# Patient Record
Sex: MALE | Race: WHITE | NOT HISPANIC OR LATINO
[De-identification: names, ages, dates, MRNs, and addresses within clinical notes are randomized per-mention and may not be internally consistent; named-entity substitution may affect disease eponyms.]

---

## 2017-04-06 ENCOUNTER — APPOINTMENT (OUTPATIENT)
Dept: CARDIOLOGY | Facility: CLINIC | Age: 67
End: 2017-04-06

## 2017-04-20 ENCOUNTER — APPOINTMENT (OUTPATIENT)
Dept: CARDIOLOGY | Facility: CLINIC | Age: 67
End: 2017-04-20

## 2017-10-19 ENCOUNTER — APPOINTMENT (OUTPATIENT)
Dept: CARDIOLOGY | Facility: CLINIC | Age: 67
End: 2017-10-19
Payer: MEDICARE

## 2017-10-19 PROCEDURE — 99214 OFFICE O/P EST MOD 30 MIN: CPT

## 2017-11-01 ENCOUNTER — APPOINTMENT (OUTPATIENT)
Dept: CARDIOLOGY | Facility: CLINIC | Age: 67
End: 2017-11-01
Payer: MEDICARE

## 2017-11-01 PROCEDURE — 93015 CV STRESS TEST SUPVJ I&R: CPT

## 2017-11-01 PROCEDURE — 78452 HT MUSCLE IMAGE SPECT MULT: CPT

## 2017-11-01 PROCEDURE — A9502: CPT

## 2017-11-02 ENCOUNTER — APPOINTMENT (OUTPATIENT)
Dept: CARDIOLOGY | Facility: CLINIC | Age: 67
End: 2017-11-02
Payer: MEDICARE

## 2017-11-02 PROCEDURE — 99214 OFFICE O/P EST MOD 30 MIN: CPT

## 2018-04-26 ENCOUNTER — APPOINTMENT (OUTPATIENT)
Dept: CARDIOLOGY | Facility: CLINIC | Age: 68
End: 2018-04-26
Payer: MEDICARE

## 2018-04-26 VITALS
DIASTOLIC BLOOD PRESSURE: 62 MMHG | WEIGHT: 181 LBS | SYSTOLIC BLOOD PRESSURE: 118 MMHG | HEART RATE: 82 BPM | HEIGHT: 67 IN | BODY MASS INDEX: 28.41 KG/M2

## 2018-04-26 PROCEDURE — 99214 OFFICE O/P EST MOD 30 MIN: CPT

## 2018-04-26 RX ORDER — FLUTICASONE PROPIONATE 50 UG/1
50 SPRAY, METERED NASAL
Qty: 16 | Refills: 0 | Status: ACTIVE | COMMUNITY
Start: 2018-04-12

## 2018-04-26 RX ORDER — DICLOFENAC SODIUM 10 MG/G
1 GEL TOPICAL
Qty: 300 | Refills: 0 | Status: ACTIVE | COMMUNITY
Start: 2017-10-26

## 2018-04-26 RX ORDER — TERAZOSIN 10 MG/1
10 CAPSULE ORAL
Qty: 90 | Refills: 0 | Status: ACTIVE | COMMUNITY
Start: 2018-01-31

## 2018-07-19 ENCOUNTER — APPOINTMENT (OUTPATIENT)
Dept: CARDIOLOGY | Facility: CLINIC | Age: 68
End: 2018-07-19
Payer: MEDICARE

## 2018-07-19 PROCEDURE — 93306 TTE W/DOPPLER COMPLETE: CPT

## 2018-07-19 PROCEDURE — 93880 EXTRACRANIAL BILAT STUDY: CPT

## 2018-07-19 PROCEDURE — 93979 VASCULAR STUDY: CPT

## 2018-08-16 ENCOUNTER — APPOINTMENT (OUTPATIENT)
Dept: CARDIOLOGY | Facility: CLINIC | Age: 68
End: 2018-08-16
Payer: MEDICARE

## 2018-08-16 VITALS
HEART RATE: 93 BPM | DIASTOLIC BLOOD PRESSURE: 62 MMHG | BODY MASS INDEX: 29.03 KG/M2 | HEIGHT: 67 IN | SYSTOLIC BLOOD PRESSURE: 118 MMHG | WEIGHT: 185 LBS

## 2018-08-16 PROCEDURE — 99214 OFFICE O/P EST MOD 30 MIN: CPT

## 2019-02-21 ENCOUNTER — APPOINTMENT (OUTPATIENT)
Dept: CARDIOLOGY | Facility: CLINIC | Age: 69
End: 2019-02-21
Payer: MEDICARE

## 2019-02-21 VITALS
WEIGHT: 187 LBS | DIASTOLIC BLOOD PRESSURE: 70 MMHG | OXYGEN SATURATION: 96 % | SYSTOLIC BLOOD PRESSURE: 128 MMHG | HEIGHT: 67 IN | HEART RATE: 75 BPM | BODY MASS INDEX: 29.35 KG/M2

## 2019-02-21 PROCEDURE — 99214 OFFICE O/P EST MOD 30 MIN: CPT

## 2019-02-21 NOTE — PHYSICAL EXAM
[General Appearance - Well Developed] : well developed [Normal Appearance] : normal appearance [Well Groomed] : well groomed [General Appearance - Well Nourished] : well nourished [No Deformities] : no deformities [General Appearance - In No Acute Distress] : no acute distress [Normal Conjunctiva] : the conjunctiva exhibited no abnormalities [Eyelids - No Xanthelasma] : the eyelids demonstrated no xanthelasmas [Normal Oral Mucosa] : normal oral mucosa [No Oral Pallor] : no oral pallor [No Oral Cyanosis] : no oral cyanosis [Normal Jugular Venous A Waves Present] : normal jugular venous A waves present [Normal Jugular Venous V Waves Present] : normal jugular venous V waves present [No Jugular Venous Omalley A Waves] : no jugular venous omalley A waves [Respiration, Rhythm And Depth] : normal respiratory rhythm and effort [Exaggerated Use Of Accessory Muscles For Inspiration] : no accessory muscle use [Auscultation Breath Sounds / Voice Sounds] : lungs were clear to auscultation bilaterally [Heart Rate And Rhythm] : heart rate and rhythm were normal [Heart Sounds] : normal S1 and S2 [Murmurs] : no murmurs present [Abdomen Soft] : soft [Abdomen Tenderness] : non-tender [Abdomen Mass (___ Cm)] : no abdominal mass palpated [Abnormal Walk] : normal gait [Gait - Sufficient For Exercise Testing] : the gait was sufficient for exercise testing [Nail Clubbing] : no clubbing of the fingernails [Cyanosis, Localized] : no localized cyanosis [Petechial Hemorrhages (___cm)] : no petechial hemorrhages [Skin Color & Pigmentation] : normal skin color and pigmentation [] : no rash [No Venous Stasis] : no venous stasis [Skin Lesions] : no skin lesions [No Skin Ulcers] : no skin ulcer [No Xanthoma] : no  xanthoma was observed [Oriented To Time, Place, And Person] : oriented to person, place, and time [Affect] : the affect was normal [Mood] : the mood was normal [No Anxiety] : not feeling anxious

## 2019-02-21 NOTE — REVIEW OF SYSTEMS
[Shortness Of Breath] : shortness of breath [Heartburn] : heartburn [Negative] : Heme/Lymph [Dyspnea on exertion] : not dyspnea during exertion [Chest  Pressure] : no chest pressure [Chest Pain] : no chest pain [Lower Ext Edema] : no extremity edema [Leg Claudication] : no intermittent leg claudication [Palpitations] : no palpitations [Abdominal Pain] : no abdominal pain [Nausea] : no nausea [Vomiting] : no vomiting [Change in Appetite] : no change in appetite [Change In The Stool] : no change in stool [Dysphagia] : no dysphagia

## 2019-02-21 NOTE — ASSESSMENT
[FreeTextEntry1] : Korey is a 67-year-old male with medical history detailed above and active medical issues including: \par  \par -Atypical chest pain resolved, normal perfusion and PI stress test normal LVEF November 2017 \par \par - Borderline hypertension dietary control at goal <130/80.\par \par -Borderline dyslipidemia currently off statin therapy, repeat at guideline goal\par \par -Arthritis, knee pain and shoulder pain, limited exercise capacity.\par \par - Patient has mild leg cramping with walking.  Patient will have evaluation for PVD with SHIV lower extremities\par \par - Patient followed by neurology for Alzheimer's dementia\par \par -Family history of premature coronary artery disease.\par \par Advised patient to follow active lifestyle with regular cardiovascular exercise. Patient educated on lifestyle and diet modification with low sodium low fat diet and avoidance of excessive alcohol. Patient is aware to call with any symptoms or concerns. \par \par Cardiology followup 6 months. Patient currently not on cardiac medication. Repeat labs will be ordered with PMD. \par \par Korey will followup with Dr Mack Mancera for primary care\par

## 2019-02-21 NOTE — REASON FOR VISIT
[Follow-Up - Clinic] : a clinic follow-up of [FreeTextEntry2] : atypical chest pain, borderline HTN, borderline dyslipidemia [FreeTextEntry1] : Korey is a 68-year-old male with a history of borderline hypertension, borderline dyslipidemia, arthritis, knee pain and shoulder pain, and palpitations, GERD, atypical chest pain.\par \par No further chest pain since last office visit. Patient had chest pain evaluation at [facility] August 2017. Patient has dyspnea with moderate exertion, unchanged. Cardiovascular review of symptoms is negative for palpitations, dizziness or syncope. No PND or orthopnea leg edema. No bleeding or black stool.\par \par Patient exercising on a  bike 15 minutes without exertional chest pain. Patient has mild leg cramping with walking.  Patient will have evaluation for PVD with SHIV lower extremities\par \par Lexascan Myoview stress test November 2017, LDL 58%, normal perfusion, nonischemic EKG response, no anginal symptoms, baseline sinus rhythm PRWP, ASWMI unchanged, NSST\par  \par Lexiscan Myoview stress test April 2016, normal LVEF normal perfusion, nonischemic EKG Baseline sinus rhythm age undetermined anterolateral my, no anginal symptoms, 30\par \par 2-D echo March 2016, LVEF 60%, diastolic dysfunction, mild MR TR.\par \par Carotid and abdominal ultrasound March 2016, normal aortic size nonobstructive findings.\par \par Lexiscan Myoview stress test Feb 2015, normal LVEF normal wall motion, small inferoapical attenuation artifact, baseline NSR, PRWP\par \par 2DEcho Feb 2015, LVEF 60%, mild MR and TR\par \par Carotid and abdominal ultrasound nonobstructive, mid abdominal aorta 3.0cm (prior 1.6cm in 2012)\par \par EKG is sinus rhythm, age-indeterminate septal MI, inferolateral ST-T wave abnormality.\par

## 2019-03-20 ENCOUNTER — APPOINTMENT (OUTPATIENT)
Dept: CARDIOLOGY | Facility: CLINIC | Age: 69
End: 2019-03-20
Payer: MEDICARE

## 2019-03-20 PROCEDURE — 93923 UPR/LXTR ART STDY 3+ LVLS: CPT

## 2019-03-20 PROCEDURE — 93306 TTE W/DOPPLER COMPLETE: CPT

## 2019-03-27 ENCOUNTER — APPOINTMENT (OUTPATIENT)
Dept: CARDIOLOGY | Facility: CLINIC | Age: 69
End: 2019-03-27
Payer: MEDICARE

## 2019-03-27 VITALS
OXYGEN SATURATION: 99 % | DIASTOLIC BLOOD PRESSURE: 64 MMHG | WEIGHT: 180 LBS | SYSTOLIC BLOOD PRESSURE: 110 MMHG | HEART RATE: 76 BPM | BODY MASS INDEX: 28.25 KG/M2 | HEIGHT: 67 IN

## 2019-03-27 PROCEDURE — 99214 OFFICE O/P EST MOD 30 MIN: CPT

## 2019-03-27 NOTE — ASSESSMENT
[FreeTextEntry1] : Korey is a 68-year-old male with medical history detailed above and active medical issues including: \par  \par -Atypical chest pain resolved, normal perfusion and PI stress test normal LVEF November 2017. \par \par - Borderline hypertension dietary control at goal <130/80.\par \par -Borderline dyslipidemia currently off statin therapy, repeat at guideline goal\par \par -Arthritis, knee pain and shoulder pain, limited exercise capacity.\par \par - No significant leg claudication, normal SHIV\par \par - Patient followed by neurology for Alzheimer's dementia\par \par -Family history of premature coronary artery disease.\par \par Advised patient to follow active lifestyle with regular cardiovascular exercise. Patient educated on lifestyle and diet modification with low sodium low fat diet and avoidance of excessive alcohol. Patient is aware to call with any symptoms or concerns. \par \par Cardiology followup 6 months. Patient currently not on cardiac medication. Repeat labs will be ordered with PMD. \par \par Korey will followup with Dr Mack Mancera for primary care\par

## 2019-03-27 NOTE — REASON FOR VISIT
[Follow-Up - Clinic] : a clinic follow-up of [FreeTextEntry2] : atypical chest pain, borderline HTN, borderline dyslipidemia [FreeTextEntry1] : Korey is a 68-year-old male with a history of borderline hypertension, borderline dyslipidemia, arthritis, knee pain and shoulder pain, and palpitations, GERD, atypical chest pain.\par \par No further chest pain since last office visit. Patient had chest pain evaluation at [facility] August 2017. Patient has dyspnea with moderate exertion, unchanged. Cardiovascular review of symptoms is negative for palpitations, dizziness or syncope. No PND or orthopnea leg edema. No bleeding or black stool.\par \par Patient exercising on a  bike 20 minutes without exertional chest pain.\par \par Echocardiogram March 2019, LVEF 65%, mild concentric LVH, mild diastolic dysfunction, mild MR, AI, TR, normal RVSP\par \par SHIV March 2019 normal bilateral study, noncompressible right calf. \par \par Lexascan Myoview stress test November 2017, LDL 58%, normal perfusion, nonischemic EKG response, no anginal symptoms, baseline sinus rhythm PRWP, ASWMI unchanged, NSST\par  \par Lexiscan Myoview stress test April 2016, normal LVEF normal perfusion, nonischemic EKG Baseline sinus rhythm age undetermined anterolateral my, no anginal symptoms, 30\par \par 2-D echo March 2016, LVEF 60%, diastolic dysfunction, mild MR TR.\par \par Carotid and abdominal ultrasound March 2016, normal aortic size nonobstructive findings.\par \par Lexiscan Myoview stress test Feb 2015, normal LVEF normal wall motion, small inferoapical attenuation artifact, baseline NSR, PRWP\par \par 2DEcho Feb 2015, LVEF 60%, mild MR and TR\par \par Carotid and abdominal ultrasound nonobstructive, mid abdominal aorta 3.0cm (prior 1.6cm in 2012)\par \par EKG is sinus rhythm, age-indeterminate septal MI, inferolateral ST-T wave abnormality.\par

## 2019-03-27 NOTE — REVIEW OF SYSTEMS
[Negative] : Heme/Lymph [Shortness Of Breath] : no shortness of breath [Dyspnea on exertion] : not dyspnea during exertion [Chest  Pressure] : no chest pressure [Chest Pain] : no chest pain [Lower Ext Edema] : no extremity edema [Leg Claudication] : no intermittent leg claudication [Palpitations] : no palpitations [Abdominal Pain] : no abdominal pain [Nausea] : no nausea [Vomiting] : no vomiting [Heartburn] : no heartburn [Change in Appetite] : no change in appetite [Change In The Stool] : no change in stool [Dysphagia] : no dysphagia

## 2019-10-03 ENCOUNTER — APPOINTMENT (OUTPATIENT)
Dept: CARDIOLOGY | Facility: CLINIC | Age: 69
End: 2019-10-03

## 2019-10-25 ENCOUNTER — NON-APPOINTMENT (OUTPATIENT)
Age: 69
End: 2019-10-25

## 2019-10-25 ENCOUNTER — APPOINTMENT (OUTPATIENT)
Dept: CARDIOLOGY | Facility: CLINIC | Age: 69
End: 2019-10-25
Payer: MEDICARE

## 2019-10-25 VITALS
HEART RATE: 67 BPM | HEIGHT: 67 IN | DIASTOLIC BLOOD PRESSURE: 72 MMHG | SYSTOLIC BLOOD PRESSURE: 120 MMHG | BODY MASS INDEX: 28.25 KG/M2 | WEIGHT: 180 LBS | OXYGEN SATURATION: 95 %

## 2019-10-25 PROCEDURE — 99214 OFFICE O/P EST MOD 30 MIN: CPT

## 2019-10-25 PROCEDURE — 93000 ELECTROCARDIOGRAM COMPLETE: CPT

## 2019-10-25 NOTE — REVIEW OF SYSTEMS
[Negative] : Heme/Lymph [Dyspnea on exertion] : dyspnea during exertion [Shortness Of Breath] : no shortness of breath [Chest  Pressure] : no chest pressure [Chest Pain] : no chest pain [Lower Ext Edema] : no extremity edema [Leg Claudication] : no intermittent leg claudication [Palpitations] : no palpitations [Abdominal Pain] : no abdominal pain [Nausea] : no nausea [Vomiting] : no vomiting [Heartburn] : no heartburn [Change in Appetite] : no change in appetite [Change In The Stool] : no change in stool [Dysphagia] : no dysphagia

## 2019-10-25 NOTE — ASSESSMENT
[FreeTextEntry1] : Korey is a 68-year-old male with medical history detailed above and active medical issues including: \par  \par -Patient has dyspnea with moderate exertion, abnormal baseline EKG, multiple CAD risk factors. Patient has knee pain and is unable to complete a treadmill stress.  Patient will be scheduled for pharmacologic stress test.\par \par - Borderline hypertension dietary control at goal <130/80.\par \par -Borderline dyslipidemia currently off statin therapy, repeat at guideline goal\par \par -Arthritis, knee pain and shoulder pain, limited exercise capacity.\par \par - No significant leg claudication, normal SHIV\par \par - Patient followed by neurology for Alzheimer's dementia\par \par -Family history of premature coronary artery disease.\par \par Advised patient to follow active lifestyle with regular cardiovascular exercise. Patient educated on lifestyle and diet modification with low sodium low fat diet and avoidance of excessive alcohol. Patient is aware to call with any symptoms or concerns. \par \par Patient will be seen in cardiology follow-up after noninvasive testing. Patient currently not on cardiac medication. Repeat labs will be ordered with PMD. \par \par Korey will followup with Dr Mack Mancera for primary care\par

## 2019-10-25 NOTE — PHYSICAL EXAM
[General Appearance - Well Developed] : well developed [Normal Appearance] : normal appearance [No Deformities] : no deformities [Well Groomed] : well groomed [General Appearance - Well Nourished] : well nourished [General Appearance - In No Acute Distress] : no acute distress [Normal Conjunctiva] : the conjunctiva exhibited no abnormalities [Eyelids - No Xanthelasma] : the eyelids demonstrated no xanthelasmas [Normal Oral Mucosa] : normal oral mucosa [No Oral Pallor] : no oral pallor [No Oral Cyanosis] : no oral cyanosis [Normal Jugular Venous V Waves Present] : normal jugular venous V waves present [Normal Jugular Venous A Waves Present] : normal jugular venous A waves present [No Jugular Venous Omalley A Waves] : no jugular venous omalley A waves [Respiration, Rhythm And Depth] : normal respiratory rhythm and effort [Exaggerated Use Of Accessory Muscles For Inspiration] : no accessory muscle use [Auscultation Breath Sounds / Voice Sounds] : lungs were clear to auscultation bilaterally [Heart Rate And Rhythm] : heart rate and rhythm were normal [Heart Sounds] : normal S1 and S2 [Murmurs] : no murmurs present [Abdomen Soft] : soft [Abdomen Tenderness] : non-tender [Abdomen Mass (___ Cm)] : no abdominal mass palpated [Gait - Sufficient For Exercise Testing] : the gait was sufficient for exercise testing [Abnormal Walk] : normal gait [Nail Clubbing] : no clubbing of the fingernails [Petechial Hemorrhages (___cm)] : no petechial hemorrhages [Cyanosis, Localized] : no localized cyanosis [] : no rash [Skin Color & Pigmentation] : normal skin color and pigmentation [No Venous Stasis] : no venous stasis [Skin Lesions] : no skin lesions [No Skin Ulcers] : no skin ulcer [No Xanthoma] : no  xanthoma was observed [Oriented To Time, Place, And Person] : oriented to person, place, and time [Affect] : the affect was normal [Mood] : the mood was normal [No Anxiety] : not feeling anxious

## 2019-10-25 NOTE — REASON FOR VISIT
[Follow-Up - Clinic] : a clinic follow-up of [FreeTextEntry2] : [place], abnormal EKG, atypical chest pain, borderline HTN, borderline dyslipidemia [FreeTextEntry1] : Korey is a 69-year-old male with a history of borderline hypertension, borderline dyslipidemia, Alzheimer's dementia, arthritis, knee pain and shoulder pain, and palpitations, GERD, atypical chest pain.\par \par Patient has dyspnea with moderate exertion. Cardiovascular review of symptoms is negative for exertional chest pain, palpitations, dizziness or syncope.  No PND or orthopnea leg edema.  No bleeding or black stool.\par \par Patient has knee pain and is unable to complete a treadmill stress.  Patient will be scheduled for pharmacologic stress test.\par \par Echocardiogram March 2019, LVEF 65%, mild concentric LVH, mild diastolic dysfunction, mild MR, AI, TR, normal RVSP\par \par SHIV March 2019 normal bilateral study, noncompressible right calf. \par \par Lexascan Myoview stress test November 2017, LDL 58%, normal perfusion, nonischemic EKG response, no anginal symptoms, baseline sinus rhythm PRWP, ASWMI unchanged, NSST\par  \par Lexiscan Myoview stress test April 2016, normal LVEF normal perfusion, nonischemic EKG Baseline sinus rhythm age undetermined anterolateral my, no anginal symptoms, 30\par \par 2-D echo March 2016, LVEF 60%, diastolic dysfunction, mild MR TR.\par \par Carotid and abdominal ultrasound March 2016, normal aortic size nonobstructive findings.\par \par Lexiscan Myoview stress test Feb 2015, normal LVEF normal wall motion, small inferoapical attenuation artifact, baseline NSR, PRWP\par \par 2DEcho Feb 2015, LVEF 60%, mild MR and TR\par \par Carotid and abdominal ultrasound nonobstructive, mid abdominal aorta 3.0cm (prior 1.6cm in 2012)\par \par EKG is sinus rhythm, age-indeterminate septal MI, inferolateral ST-T wave abnormality.\par

## 2019-10-31 ENCOUNTER — APPOINTMENT (OUTPATIENT)
Dept: CARDIOLOGY | Facility: CLINIC | Age: 69
End: 2019-10-31

## 2019-11-15 ENCOUNTER — APPOINTMENT (OUTPATIENT)
Dept: CARDIOLOGY | Facility: CLINIC | Age: 69
End: 2019-11-15
Payer: MEDICARE

## 2019-11-15 PROCEDURE — 93015 CV STRESS TEST SUPVJ I&R: CPT

## 2019-11-15 PROCEDURE — 78452 HT MUSCLE IMAGE SPECT MULT: CPT

## 2019-11-15 PROCEDURE — A9502: CPT

## 2019-12-12 ENCOUNTER — APPOINTMENT (OUTPATIENT)
Dept: CARDIOLOGY | Facility: CLINIC | Age: 69
End: 2019-12-12
Payer: MEDICARE

## 2019-12-12 VITALS
DIASTOLIC BLOOD PRESSURE: 70 MMHG | OXYGEN SATURATION: 95 % | WEIGHT: 175 LBS | BODY MASS INDEX: 27.47 KG/M2 | HEIGHT: 67 IN | HEART RATE: 88 BPM | SYSTOLIC BLOOD PRESSURE: 122 MMHG

## 2019-12-12 PROCEDURE — 99214 OFFICE O/P EST MOD 30 MIN: CPT

## 2019-12-12 RX ORDER — MEMANTINE HYDROCHLORIDE 5 MG-10 MG
28 X 5 MG & KIT ORAL
Qty: 49 | Refills: 0 | Status: DISCONTINUED | COMMUNITY
Start: 2018-04-04 | End: 2019-12-12

## 2019-12-12 NOTE — ASSESSMENT
[FreeTextEntry1] : Korey is a 69-year-old male with medical history detailed above and active medical issues including: \par  \par - No anginal symptoms, normal perfusion Myoview stress test, normal LVEF, December 2019\par \par - Borderline hypertension dietary control at goal <130/80.\par \par - Borderline dyslipidemia currently off statin therapy, repeat at guideline goal\par \par - Arthritis, knee pain and shoulder pain, limited exercise capacity.\par \par - No significant leg claudication, normal SHIV\par \par - Patient followed by neurology for Alzheimer's dementia\par \par - Family history of premature coronary artery disease.\par \par Advised patient to follow active lifestyle with regular cardiovascular exercise. Patient educated on lifestyle and diet modification with low sodium low fat diet and avoidance of excessive alcohol. Patient is aware to call with any symptoms or concerns. \par \par Patient will be seen in cardiology follow-up after noninvasive testing. Patient currently not on cardiac medication. Repeat labs will be ordered with PMD. \par \par Korey will followup with Dr Mack Mancera for primary care\par

## 2019-12-12 NOTE — PHYSICAL EXAM
[General Appearance - Well Developed] : well developed [Normal Appearance] : normal appearance [Well Groomed] : well groomed [No Deformities] : no deformities [General Appearance - Well Nourished] : well nourished [General Appearance - In No Acute Distress] : no acute distress [Normal Conjunctiva] : the conjunctiva exhibited no abnormalities [Eyelids - No Xanthelasma] : the eyelids demonstrated no xanthelasmas [Normal Oral Mucosa] : normal oral mucosa [No Oral Pallor] : no oral pallor [No Oral Cyanosis] : no oral cyanosis [Normal Jugular Venous A Waves Present] : normal jugular venous A waves present [Normal Jugular Venous V Waves Present] : normal jugular venous V waves present [No Jugular Venous Omalley A Waves] : no jugular venous omalley A waves [Respiration, Rhythm And Depth] : normal respiratory rhythm and effort [Exaggerated Use Of Accessory Muscles For Inspiration] : no accessory muscle use [Auscultation Breath Sounds / Voice Sounds] : lungs were clear to auscultation bilaterally [Heart Rate And Rhythm] : heart rate and rhythm were normal [Heart Sounds] : normal S1 and S2 [Murmurs] : no murmurs present [Abdomen Soft] : soft [Abdomen Tenderness] : non-tender [Abdomen Mass (___ Cm)] : no abdominal mass palpated [Abnormal Walk] : normal gait [Gait - Sufficient For Exercise Testing] : the gait was sufficient for exercise testing [Nail Clubbing] : no clubbing of the fingernails [Cyanosis, Localized] : no localized cyanosis [Petechial Hemorrhages (___cm)] : no petechial hemorrhages [Skin Color & Pigmentation] : normal skin color and pigmentation [] : no rash [No Venous Stasis] : no venous stasis [Skin Lesions] : no skin lesions [No Skin Ulcers] : no skin ulcer [No Xanthoma] : no  xanthoma was observed [Oriented To Time, Place, And Person] : oriented to person, place, and time [Affect] : the affect was normal [Mood] : the mood was normal [No Anxiety] : not feeling anxious

## 2019-12-12 NOTE — REVIEW OF SYSTEMS
[Dyspnea on exertion] : dyspnea during exertion [Negative] : Heme/Lymph [Shortness Of Breath] : no shortness of breath [Chest Pain] : no chest pain [Lower Ext Edema] : no extremity edema [Chest  Pressure] : no chest pressure [Leg Claudication] : no intermittent leg claudication [Palpitations] : no palpitations [Abdominal Pain] : no abdominal pain [Heartburn] : no heartburn [Nausea] : no nausea [Vomiting] : no vomiting [Change in Appetite] : no change in appetite [Change In The Stool] : no change in stool [Dysphagia] : no dysphagia

## 2019-12-12 NOTE — REASON FOR VISIT
[Follow-Up - Clinic] : a clinic follow-up of [FreeTextEntry1] : Korey is a 69-year-old male with a history of borderline hypertension, borderline dyslipidemia, Alzheimer's dementia, arthritis, knee pain and shoulder pain, and palpitations, GERD, atypical chest pain.\par \par Cardiovascular review of symptoms is negative for exertional chest pain, dyspnea, palpitations, dizziness or syncope.  No PND or orthopnea leg edema.  No bleeding or black stool.\par \par Patient has knee pain walking 10 minutes without exertional chest pain.\par \par Lexascan Myoview stress test November 2019, LVEF 61%, normal perfusion, diaphragm attenuation seen, no chest pain, no ischemic EKG response, baseline sinus rhythm PRWP NSST\par \par Echocardiogram March 2019, LVEF 65%, mild concentric LVH, mild diastolic dysfunction, mild MR, AI, TR, normal RVSP\par \par SHIV March 2019 normal bilateral study, noncompressible right calf. \par \par Lexascan Myoview stress test November 2017, LDL 58%, normal perfusion, nonischemic EKG response, no anginal symptoms, baseline sinus rhythm PRWP, ASWMI unchanged, NSST\par  \par Lexiscan Myoview stress test April 2016, normal LVEF normal perfusion, nonischemic EKG Baseline sinus rhythm age undetermined anterolateral my, no anginal symptoms, 30\par \par 2-D echo March 2016, LVEF 60%, diastolic dysfunction, mild MR TR.\par \par Carotid and abdominal ultrasound March 2016, normal aortic size nonobstructive findings.\par \par Lexiscan Myoview stress test Feb 2015, normal LVEF normal wall motion, small inferoapical attenuation artifact, baseline NSR, PRWP\par \par 2DEcho Feb 2015, LVEF 60%, mild MR and TR\par \par Carotid and abdominal ultrasound nonobstructive, mid abdominal aorta 3.0cm (prior 1.6cm in 2012)\par \par EKG is sinus rhythm, age-indeterminate septal MI, inferolateral ST-T wave abnormality.\par  [FreeTextEntry2] : noninvasive testing for abnormal EKG, atypical chest pain, borderline HTN, borderline dyslipidemia

## 2020-06-11 ENCOUNTER — APPOINTMENT (OUTPATIENT)
Dept: CARDIOLOGY | Facility: CLINIC | Age: 70
End: 2020-06-11
Payer: MEDICARE

## 2020-06-11 VITALS
SYSTOLIC BLOOD PRESSURE: 110 MMHG | OXYGEN SATURATION: 98 % | RESPIRATION RATE: 15 BRPM | BODY MASS INDEX: 27.47 KG/M2 | HEIGHT: 67 IN | DIASTOLIC BLOOD PRESSURE: 70 MMHG | HEART RATE: 79 BPM | WEIGHT: 175 LBS

## 2020-06-11 PROCEDURE — 99214 OFFICE O/P EST MOD 30 MIN: CPT

## 2020-06-11 NOTE — PHYSICAL EXAM
[General Appearance - Well Developed] : well developed [General Appearance - Well Nourished] : well nourished [Well Groomed] : well groomed [Normal Appearance] : normal appearance [General Appearance - In No Acute Distress] : no acute distress [No Deformities] : no deformities [Eyelids - No Xanthelasma] : the eyelids demonstrated no xanthelasmas [Normal Conjunctiva] : the conjunctiva exhibited no abnormalities [Normal Oral Mucosa] : normal oral mucosa [No Oral Pallor] : no oral pallor [Normal Jugular Venous V Waves Present] : normal jugular venous V waves present [No Oral Cyanosis] : no oral cyanosis [Normal Jugular Venous A Waves Present] : normal jugular venous A waves present [No Jugular Venous Omalley A Waves] : no jugular venous omalley A waves [Respiration, Rhythm And Depth] : normal respiratory rhythm and effort [Auscultation Breath Sounds / Voice Sounds] : lungs were clear to auscultation bilaterally [Exaggerated Use Of Accessory Muscles For Inspiration] : no accessory muscle use [Heart Sounds] : normal S1 and S2 [Heart Rate And Rhythm] : heart rate and rhythm were normal [Murmurs] : no murmurs present [Abdomen Soft] : soft [Abdomen Tenderness] : non-tender [Abnormal Walk] : normal gait [Abdomen Mass (___ Cm)] : no abdominal mass palpated [Gait - Sufficient For Exercise Testing] : the gait was sufficient for exercise testing [Nail Clubbing] : no clubbing of the fingernails [Cyanosis, Localized] : no localized cyanosis [Petechial Hemorrhages (___cm)] : no petechial hemorrhages [] : no rash [Skin Color & Pigmentation] : normal skin color and pigmentation [No Skin Ulcers] : no skin ulcer [Skin Lesions] : no skin lesions [No Venous Stasis] : no venous stasis [Oriented To Time, Place, And Person] : oriented to person, place, and time [No Xanthoma] : no  xanthoma was observed [Affect] : the affect was normal [Mood] : the mood was normal [No Anxiety] : not feeling anxious

## 2020-06-11 NOTE — REVIEW OF SYSTEMS
[Dyspnea on exertion] : dyspnea during exertion [Negative] : Heme/Lymph [Shortness Of Breath] : no shortness of breath [Chest Pain] : no chest pain [Chest  Pressure] : no chest pressure [Lower Ext Edema] : no extremity edema [Palpitations] : no palpitations [Leg Claudication] : no intermittent leg claudication [Abdominal Pain] : no abdominal pain [Nausea] : no nausea [Vomiting] : no vomiting [Change in Appetite] : no change in appetite [Heartburn] : no heartburn [Change In The Stool] : no change in stool [Dysphagia] : no dysphagia

## 2020-06-11 NOTE — ASSESSMENT
[FreeTextEntry1] : Korey is a 69-year-old male with medical history detailed above and active medical issues including: \par  \par - No anginal symptoms, normal perfusion Myoview stress test, normal LVEF, December 2019\par \par - Borderline hypertension dietary control at goal <130/80.\par \par - Borderline dyslipidemia currently off statin therapy, repeat at guideline goal\par \par - Arthritis, knee pain and shoulder pain, limited exercise capacity.\par \par - No significant leg claudication, normal SHIV\par \par - Patient followed by neurology for Alzheimer's dementia\par \par - Family history of premature coronary artery disease.\par \par Advised patient to follow active lifestyle with regular cardiovascular exercise. Patient educated on lifestyle and diet modification with low sodium low fat diet and avoidance of excessive alcohol. Patient is aware to call with any symptoms or concerns. \par \par Patient will be seen in cardiology follow-up 6 months. Patient currently not on cardiac medication. Repeat labs ordered .  Patient will have 2-D echocardiogram to assess for  LV systolic function, wall motion and  structural heart disease.\par \par Korey will followup with Dr Mack Mancera for primary care\par

## 2020-06-11 NOTE — REASON FOR VISIT
[Follow-Up - Clinic] : a clinic follow-up of [FreeTextEntry2] : abnormal EKG, atypical chest pain, borderline HTN, borderline dyslipidemia [FreeTextEntry1] : Korey is a 69-year-old male with a history of borderline hypertension, borderline dyslipidemia, Alzheimer's dementia, arthritis, knee pain and shoulder pain, and palpitations, GERD, atypical chest pain.\par \par Cardiovascular review of symptoms is negative for exertional chest pain, dyspnea, palpitations, dizziness or syncope.  No PND or orthopnea leg edema.  No bleeding or black stool.\par \par Patient has knee pain walking 10 minutes without exertional chest pain.\par \par Lexascan Myoview stress test November 2019, LVEF 61%, normal perfusion, diaphragm attenuation seen, no chest pain, no ischemic EKG response, baseline sinus rhythm PRWP NSST\par \par Echocardiogram March 2019, LVEF 65%, mild concentric LVH, mild diastolic dysfunction, mild MR, AI, TR, normal RVSP\par \par SHIV March 2019 normal bilateral study, noncompressible right calf. \par \par Lexascan Myoview stress test November 2017, LDL 58%, normal perfusion, nonischemic EKG response, no anginal symptoms, baseline sinus rhythm PRWP, ASWMI unchanged, NSST\par  \par Lexiscan Myoview stress test April 2016, normal LVEF normal perfusion, nonischemic EKG Baseline sinus rhythm age undetermined anterolateral my, no anginal symptoms, 30\par \par 2-D echo March 2016, LVEF 60%, diastolic dysfunction, mild MR TR.\par \par Carotid and abdominal ultrasound March 2016, normal aortic size nonobstructive findings.\par \par Lexiscan Myoview stress test Feb 2015, normal LVEF normal wall motion, small inferoapical attenuation artifact, baseline NSR, PRWP\par \par 2DEcho Feb 2015, LVEF 60%, mild MR and TR\par \par Carotid and abdominal ultrasound nonobstructive, mid abdominal aorta 3.0cm (prior 1.6cm in 2012)\par \par EKG is sinus rhythm, age-indeterminate septal MI, inferolateral ST-T wave abnormality.\par

## 2020-06-15 ENCOUNTER — APPOINTMENT (OUTPATIENT)
Dept: CARDIOLOGY | Facility: CLINIC | Age: 70
End: 2020-06-15

## 2020-08-03 ENCOUNTER — APPOINTMENT (OUTPATIENT)
Dept: CARDIOLOGY | Facility: CLINIC | Age: 70
End: 2020-08-03
Payer: MEDICARE

## 2020-08-03 PROCEDURE — 93306 TTE W/DOPPLER COMPLETE: CPT

## 2020-12-03 ENCOUNTER — APPOINTMENT (OUTPATIENT)
Dept: CARDIOLOGY | Facility: CLINIC | Age: 70
End: 2020-12-03
Payer: MEDICARE

## 2020-12-03 ENCOUNTER — NON-APPOINTMENT (OUTPATIENT)
Age: 70
End: 2020-12-03

## 2020-12-03 VITALS
DIASTOLIC BLOOD PRESSURE: 72 MMHG | HEIGHT: 67 IN | TEMPERATURE: 98.6 F | HEART RATE: 91 BPM | BODY MASS INDEX: 29.98 KG/M2 | SYSTOLIC BLOOD PRESSURE: 112 MMHG | WEIGHT: 191 LBS | OXYGEN SATURATION: 99 %

## 2020-12-03 PROCEDURE — 99214 OFFICE O/P EST MOD 30 MIN: CPT

## 2020-12-03 PROCEDURE — 93000 ELECTROCARDIOGRAM COMPLETE: CPT

## 2020-12-03 NOTE — REVIEW OF SYSTEMS
[Dyspnea on exertion] : dyspnea during exertion [Negative] : Heme/Lymph [Shortness Of Breath] : no shortness of breath [Chest  Pressure] : no chest pressure [Chest Pain] : no chest pain [Lower Ext Edema] : no extremity edema [Leg Claudication] : no intermittent leg claudication [Palpitations] : no palpitations [Abdominal Pain] : no abdominal pain [Nausea] : no nausea [Vomiting] : no vomiting [Heartburn] : no heartburn [Change in Appetite] : no change in appetite [Change In The Stool] : no change in stool [Dysphagia] : no dysphagia

## 2020-12-03 NOTE — REASON FOR VISIT
[Follow-Up - Clinic] : a clinic follow-up of [FreeTextEntry2] : abnormal EKG, atypical chest pain, borderline HTN, borderline dyslipidemia [FreeTextEntry1] : Korey is a 70-year-old male with a history of borderline hypertension, borderline dyslipidemia, Alzheimer's dementia, arthritis, knee pain and shoulder pain, and palpitations, GERD, atypical chest pain.\par \par Cardiovascular review of symptoms is negative for exertional chest pain, dyspnea, palpitations, dizziness or syncope.  No PND or orthopnea leg edema.  No bleeding or black stool.\par \par Patient has knee pain walking 10 minutes without exertional chest pain.\par \par Lexascan Myoview stress test November 2019, LVEF 61%, normal perfusion, diaphragm attenuation seen, no chest pain, no ischemic EKG response, baseline sinus rhythm PRWP NSST\par \par Echocardiogram March 2019, LVEF 65%, mild concentric LVH, mild diastolic dysfunction, mild MR, AI, TR, normal RVSP\par \par SHIV March 2019 normal bilateral study, noncompressible right calf. \par \par Lexascan Myoview stress test November 2017, LDL 58%, normal perfusion, nonischemic EKG response, no anginal symptoms, baseline sinus rhythm PRWP, ASWMI unchanged, NSST\par  \par Lexiscan Myoview stress test April 2016, normal LVEF normal perfusion, nonischemic EKG Baseline sinus rhythm age undetermined anterolateral my, no anginal symptoms, 30\par \par 2-D echo March 2016, LVEF 60%, diastolic dysfunction, mild MR TR.\par \par Carotid and abdominal ultrasound March 2016, normal aortic size nonobstructive findings.\par \par Lexiscan Myoview stress test Feb 2015, normal LVEF normal wall motion, small inferoapical attenuation artifact, baseline NSR, PRWP\par \par 2DEcho Feb 2015, LVEF 60%, mild MR and TR\par \par Carotid and abdominal ultrasound nonobstructive, mid abdominal aorta 3.0cm (prior 1.6cm in 2012)\par \par EKG is sinus rhythm, age-indeterminate septal MI, inferolateral ST-T wave abnormality.\par

## 2020-12-03 NOTE — ASSESSMENT
[FreeTextEntry1] : Korey is a 70-year-old male with medical history detailed above and active medical issues including: \par  \par - No anginal symptoms, normal perfusion Myoview stress test, normal LVEF, December 2019\par \par - Borderline hypertension dietary control at goal <130/80.\par \par - Borderline dyslipidemia currently off statin therapy, repeat at guideline goal\par \par - Arthritis, knee pain and shoulder pain, limited exercise capacity.\par \par - No significant leg claudication, normal SHIV\par \par - Patient followed by neurology for Alzheimer's dementia\par \par - Family history of premature coronary artery disease.\par \par Advised patient to follow active lifestyle with regular cardiovascular exercise. Patient educated on lifestyle and diet modification with low sodium low fat diet and avoidance of excessive alcohol. Patient is aware to call with any symptoms or concerns. \par \par Patient will be seen in cardiology follow-up 6 months. Patient currently not on cardiac medication. Repeat labs ordered .  Patient will have 2-D echocardiogram to assess for  LV systolic function, wall motion and  structural heart disease.\par \par Korey will followup with Dr Mack Mancera for primary care\par

## 2021-03-17 ENCOUNTER — APPOINTMENT (OUTPATIENT)
Dept: CARDIOLOGY | Facility: CLINIC | Age: 71
End: 2021-03-17
Payer: MEDICARE

## 2021-03-17 VITALS
SYSTOLIC BLOOD PRESSURE: 118 MMHG | TEMPERATURE: 97.8 F | OXYGEN SATURATION: 98 % | HEIGHT: 67 IN | BODY MASS INDEX: 28.25 KG/M2 | WEIGHT: 180 LBS | HEART RATE: 78 BPM | DIASTOLIC BLOOD PRESSURE: 60 MMHG

## 2021-03-17 PROCEDURE — 99214 OFFICE O/P EST MOD 30 MIN: CPT

## 2021-03-17 RX ORDER — METOPROLOL SUCCINATE 50 MG/1
50 TABLET, EXTENDED RELEASE ORAL
Qty: 90 | Refills: 3 | Status: DISCONTINUED | COMMUNITY
Start: 2021-03-08 | End: 2021-03-17

## 2021-03-17 NOTE — REASON FOR VISIT
[Follow-Up - Clinic] : a clinic follow-up of [FreeTextEntry2] : abnormal EKG, atypical chest pain, borderline HTN, borderline dyslipidemia [FreeTextEntry1] : Korey is a 70-year-old male with a history of borderline hypertension, borderline dyslipidemia, Alzheimer's dementia, arthritis, knee pain and shoulder pain, and palpitations, GERD, atypical chest pain.\par \par Cardiovascular review of symptoms is negative for exertional chest pain, dyspnea, palpitations, dizziness or syncope.  No PND or orthopnea leg edema.  No bleeding or black stool.\par \par Patient has knee pain with limited walking <5 minutes, unable to complete a treadmill stress test. Patient will be scheduled for pharmacologic stress test. \par \par Patient had recent elevation in blood pressure started on Toprol 50 mg daily subsequently increased to 100 mg daily.  Office blood pressure today 110/60 heart rate 78, patient will continue current dose.\par \par Lexascan Myoview stress test November 2019, LVEF 61%, normal perfusion, diaphragm attenuation seen, no chest pain, no ischemic EKG response, baseline sinus rhythm PRWP NSST\par \par Echocardiogram March 2019, LVEF 65%, mild concentric LVH, mild diastolic dysfunction, mild MR, AI, TR, normal RVSP\par \par SHIV March 2019 normal bilateral study, noncompressible right calf. \par \par Lexascan Myoview stress test November 2017, LDL 58%, normal perfusion, nonischemic EKG response, no anginal symptoms, baseline sinus rhythm PRWP, ASWMI unchanged, NSST\par  \par Lexiscan Myoview stress test April 2016, normal LVEF normal perfusion, nonischemic EKG Baseline sinus rhythm age undetermined anterolateral my, no anginal symptoms, 30\par \par 2-D echo March 2016, LVEF 60%, diastolic dysfunction, mild MR TR.\par \par Carotid and abdominal ultrasound March 2016, normal aortic size nonobstructive findings.\par \par Lexiscan Myoview stress test Feb 2015, normal LVEF normal wall motion, small inferoapical attenuation artifact, baseline NSR, PRWP\par \par 2DEcho Feb 2015, LVEF 60%, mild MR and TR\par \par Carotid and abdominal ultrasound nonobstructive, mid abdominal aorta 3.0cm (prior 1.6cm in 2012)\par \par EKG is sinus rhythm, age-indeterminate septal MI, inferolateral ST-T wave abnormality.\par

## 2021-03-17 NOTE — ASSESSMENT
[FreeTextEntry1] : Korey is a 70-year-old male with medical history detailed above and active medical issues including: \par  \par - Dyspnea on exertion, abnormal baseline EKG, multiple CAD risk factors.  Patient will have noninvasive testing with a Lexiscan Myoview stress test to assess for obstructive CAD, exercise-induced arrhythmia,  blood pressure response. \par \par - Hypertension . Patient had recent elevation in blood pressure started on Toprol 50 mg daily subsequently increased to 100 mg daily.  Office blood pressure today 110/60 heart rate 78, patient will continue current dose.\par \par - Hyperlipidemia on atorvastatin well-tolerated, follow-up labs with PCP \par \par - Arthritis, knee pain and shoulder pain, limited exercise capacity.\par \par - No significant leg claudication, normal SHVI\par \par - Patient followed by neurology for Alzheimer's dementia\par \par - Family history of premature coronary artery disease.\par \par Advised patient to follow active lifestyle with regular cardiovascular exercise. Patient educated on lifestyle and diet modification with low sodium low fat diet and avoidance of excessive alcohol. Patient is aware to call with any symptoms or concerns. \par \par Patient will be seen in cardiology follow-up after noninvasive testing.  Current cardiac medications remain unchanged. Repeat labs will be ordered with PMD.\par \par Korey will followup with Dr Mack Mancera for primary care\par

## 2021-03-23 ENCOUNTER — APPOINTMENT (OUTPATIENT)
Dept: CARDIOLOGY | Facility: CLINIC | Age: 71
End: 2021-03-23
Payer: MEDICARE

## 2021-03-23 PROCEDURE — 93306 TTE W/DOPPLER COMPLETE: CPT

## 2021-03-24 ENCOUNTER — APPOINTMENT (OUTPATIENT)
Dept: CARDIOLOGY | Facility: CLINIC | Age: 71
End: 2021-03-24
Payer: MEDICARE

## 2021-03-24 PROCEDURE — A9502: CPT

## 2021-03-24 PROCEDURE — 78452 HT MUSCLE IMAGE SPECT MULT: CPT

## 2021-03-24 PROCEDURE — 93015 CV STRESS TEST SUPVJ I&R: CPT

## 2021-03-25 ENCOUNTER — APPOINTMENT (OUTPATIENT)
Dept: CARDIOLOGY | Facility: CLINIC | Age: 71
End: 2021-03-25
Payer: MEDICARE

## 2021-03-25 ENCOUNTER — NON-APPOINTMENT (OUTPATIENT)
Age: 71
End: 2021-03-25

## 2021-03-25 VITALS
HEIGHT: 67 IN | BODY MASS INDEX: 28.25 KG/M2 | TEMPERATURE: 97.6 F | OXYGEN SATURATION: 98 % | HEART RATE: 72 BPM | DIASTOLIC BLOOD PRESSURE: 68 MMHG | WEIGHT: 180 LBS | SYSTOLIC BLOOD PRESSURE: 140 MMHG

## 2021-03-25 PROCEDURE — 99214 OFFICE O/P EST MOD 30 MIN: CPT

## 2021-03-25 NOTE — ASSESSMENT
[FreeTextEntry1] : Korey is a 70-year-old male with medical history detailed above and active medical issues including: \par  \par - Dyspnea on exertion, abnormal Myoview stress test, abnormal baseline EKG, multiple CAD risk factors.  Patient will have noninvasive testing with a coronary CTA with measurement of ascending aorta.\par \par - Hypertension . Patient had recent elevation in blood pressure started on Toprol 50 mg daily subsequently increased to 100 mg daily.  Continue to follow home BPs confirm average home BPs at guideline goal\par \par - Hyperlipidemia on atorvastatin well-tolerated, follow-up labs with PCP \par \par - Arthritis, knee pain and shoulder pain, limited exercise capacity.\par \par -Left leg claudication,  SHIV ordered\par \par - Patient followed by neurology for Alzheimer's dementia\par \par - Family history of premature coronary artery disease.\par \par Advised patient to follow active lifestyle with regular cardiovascular exercise. Patient educated on lifestyle and diet modification with low sodium low fat diet and avoidance of excessive alcohol. Patient is aware to call with any symptoms or concerns. \par \par Patient will be seen in cardiology follow-up after noninvasive testing.  Current cardiac medications remain unchanged. Repeat labs will be ordered with PMD.\par \par Korey will followup with Dr Mack Mancera for primary care\par

## 2021-03-25 NOTE — REASON FOR VISIT
[Follow-Up - Clinic] : a clinic follow-up of [FreeTextEntry2] : abnormal Myovies stress test, atypical chest pain, borderline HTN, borderline dyslipidemia [FreeTextEntry1] : Korey is a 70-year-old male with a history of borderline hypertension, borderline dyslipidemia, Alzheimer's dementia, arthritis, knee pain and shoulder pain, and palpitations, GERD, atypical chest pain.\par \par Cardiovascular review of symptoms is negative for exertional chest pain, dyspnea, palpitations, dizziness or syncope.  No PND or orthopnea leg edema.  No bleeding or black stool.\par \par No exercise routine.  Patient has knee pain with limited walking <5 minutes.\par \par Patient had recent elevation in blood pressure started on Toprol 50 mg daily subsequently increased to 100 mg daily.  Office blood pressure today 110/60 heart rate 78, patient will continue current dose.\par \par Lexiscan Myoview stress test March 2021, LVEF 55%, small anterior apical ischemic defect, nonischemic EKG response, no chest pain, baseline sinus rhythm, PRWP, NSST\par \par Echocardiogram March 2021 LVEF 60% mild to moderate MR and AI, mild TR, mild pulmonary hypertension, asending aorta 4.2 cm prior 3.4 cm. \par \par Lexascan Myoview stress test November 2019, LVEF 61%, normal perfusion, diaphragm attenuation seen, no chest pain, no ischemic EKG response, baseline sinus rhythm PRWP NSST\par \par Echocardiogram March 2019, LVEF 65%, mild concentric LVH, mild diastolic dysfunction, mild MR, AI, TR, normal RVSP\par \par SHIV March 2019 normal bilateral study, noncompressible right calf. \par \par Lexascan Myoview stress test November 2017, LDL 58%, normal perfusion, nonischemic EKG response, no anginal symptoms, baseline sinus rhythm PRWP, ASWMI unchanged, NSST\par  \par Lexiscan Myoview stress test April 2016, normal LVEF normal perfusion, nonischemic EKG Baseline sinus rhythm age undetermined anterolateral my, no anginal symptoms, 30\par \par 2-D echo March 2016, LVEF 60%, diastolic dysfunction, mild MR TR.\par \par Carotid and abdominal ultrasound March 2016, normal aortic size nonobstructive findings.\par \par Lexiscan Myoview stress test Feb 2015, normal LVEF normal wall motion, small inferoapical attenuation artifact, baseline NSR, PRWP\par \par 2DEcho Feb 2015, LVEF 60%, mild MR and TR\par \par Carotid and abdominal ultrasound nonobstructive, mid abdominal aorta 3.0cm (prior 1.6cm in 2012)\par \par EKG is sinus rhythm, age-indeterminate septal MI, inferolateral ST-T wave abnormality.\par

## 2021-04-12 ENCOUNTER — NON-APPOINTMENT (OUTPATIENT)
Age: 71
End: 2021-04-12

## 2021-04-13 ENCOUNTER — NON-APPOINTMENT (OUTPATIENT)
Age: 71
End: 2021-04-13

## 2021-04-14 ENCOUNTER — APPOINTMENT (OUTPATIENT)
Dept: CARDIOLOGY | Facility: CLINIC | Age: 71
End: 2021-04-14
Payer: MEDICARE

## 2021-04-14 PROCEDURE — 93923 UPR/LXTR ART STDY 3+ LVLS: CPT

## 2021-04-27 ENCOUNTER — NON-APPOINTMENT (OUTPATIENT)
Age: 71
End: 2021-04-27

## 2021-07-01 ENCOUNTER — APPOINTMENT (OUTPATIENT)
Dept: CARDIOLOGY | Facility: CLINIC | Age: 71
End: 2021-07-01
Payer: MEDICARE

## 2021-07-01 VITALS
BODY MASS INDEX: 28.72 KG/M2 | HEART RATE: 89 BPM | HEIGHT: 67 IN | DIASTOLIC BLOOD PRESSURE: 70 MMHG | SYSTOLIC BLOOD PRESSURE: 124 MMHG | WEIGHT: 183 LBS | TEMPERATURE: 97.7 F | OXYGEN SATURATION: 100 %

## 2021-07-01 PROCEDURE — 93880 EXTRACRANIAL BILAT STUDY: CPT

## 2021-07-01 PROCEDURE — 93306 TTE W/DOPPLER COMPLETE: CPT

## 2021-07-01 PROCEDURE — 93979 VASCULAR STUDY: CPT

## 2021-07-01 PROCEDURE — 99214 OFFICE O/P EST MOD 30 MIN: CPT

## 2021-07-01 NOTE — PHYSICAL EXAM
[General Appearance - Well Developed] : well developed [Normal Appearance] : normal appearance [Well Groomed] : well groomed [General Appearance - Well Nourished] : well nourished [No Deformities] : no deformities [General Appearance - In No Acute Distress] : no acute distress [Eyelids - No Xanthelasma] : the eyelids demonstrated no xanthelasmas [Normal Oral Mucosa] : normal oral mucosa [No Oral Pallor] : no oral pallor [No Oral Cyanosis] : no oral cyanosis [Normal Jugular Venous A Waves Present] : normal jugular venous A waves present [Normal Jugular Venous V Waves Present] : normal jugular venous V waves present [No Jugular Venous Omalley A Waves] : no jugular venous omalley A waves [Respiration, Rhythm And Depth] : normal respiratory rhythm and effort [Exaggerated Use Of Accessory Muscles For Inspiration] : no accessory muscle use [Auscultation Breath Sounds / Voice Sounds] : lungs were clear to auscultation bilaterally [Heart Rate And Rhythm] : heart rate and rhythm were normal [Heart Sounds] : normal S1 and S2 [Murmurs] : no murmurs present [Abdomen Soft] : soft [Abdomen Tenderness] : non-tender [Abdomen Mass (___ Cm)] : no abdominal mass palpated [Abnormal Walk] : normal gait [Gait - Sufficient For Exercise Testing] : the gait was sufficient for exercise testing [Nail Clubbing] : no clubbing of the fingernails [Cyanosis, Localized] : no localized cyanosis [Petechial Hemorrhages (___cm)] : no petechial hemorrhages [Skin Color & Pigmentation] : normal skin color and pigmentation [] : no rash [No Venous Stasis] : no venous stasis [Skin Lesions] : no skin lesions [No Skin Ulcers] : no skin ulcer [No Xanthoma] : no  xanthoma was observed [Oriented To Time, Place, And Person] : oriented to person, place, and time [Affect] : the affect was normal [Mood] : the mood was normal [No Anxiety] : not feeling anxious [Well Developed] : well developed [Well Nourished] : well nourished [No Acute Distress] : no acute distress [Normal Conjunctiva] : normal conjunctiva [Normal Venous Pressure] : normal venous pressure [No Carotid Bruit] : no carotid bruit [Normal S1, S2] : normal S1, S2 [No Murmur] : no murmur [No Rub] : no rub [No Gallop] : no gallop [Clear Lung Fields] : clear lung fields [Good Air Entry] : good air entry [No Respiratory Distress] : no respiratory distress  [Soft] : abdomen soft [Non Tender] : non-tender [No Masses/organomegaly] : no masses/organomegaly [Normal Bowel Sounds] : normal bowel sounds [Normal Gait] : normal gait [No Edema] : no edema [No Cyanosis] : no cyanosis [No Clubbing] : no clubbing [No Varicosities] : no varicosities [No Rash] : no rash [No Skin Lesions] : no skin lesions [Moves all extremities] : moves all extremities [No Focal Deficits] : no focal deficits [Normal Speech] : normal speech [Alert and Oriented] : alert and oriented [de-identified] : Mild memory loss

## 2021-07-01 NOTE — REVIEW OF SYSTEMS
"Chief Complaint  telephone visit and Heartburn    Subjective          Colette Velázquez presents to Dallas County [facility]   Joint Pain] : joint pain [Joint Stiffness] : joint stiffness [Negative] : Heme/Lymph

## 2021-07-01 NOTE — ASSESSMENT
[FreeTextEntry1] : Korey is a 70-year-old male with medical history detailed above and active medical issues including: \par  \par - Dyspnea on exertion, abnormal Myoview stress test, abnormal baseline EKG March 2021\par \par - Hypertension .  Average home BPs at guideline goal on 100 mg daily.  \par \par - Hyperlipidemia on atorvastatin well-tolerated, follow-up labs with PCP \par \par - Arthritis, knee pain and shoulder pain, limited exercise capacity.\par \par -Left leg claudication,  SHIV ordered\par \par - Patient followed by neurology for Alzheimer's dementia\par \par - Family history of premature coronary artery disease.\par \par Advised patient to follow active lifestyle with regular cardiovascular exercise. Patient educated on lifestyle and diet modification with low sodium low fat diet and avoidance of excessive alcohol. Patient is aware to call with any symptoms or concerns. \par \par Patient will be seen in cardiology follow-up after 6 months.  Current cardiac medications remain unchanged. Repeat labs will be ordered with PMD.\par \par Korey will followup with Dr Mack Mancera for primary care\par

## 2021-07-01 NOTE — REASON FOR VISIT
[Follow-Up - Clinic] : a clinic follow-up of [Other: ____] : [unfilled] [FreeTextEntry1] : Korey is a 70-year-old male with a history of borderline hypertension, borderline dyslipidemia, Alzheimer's dementia, arthritis, knee pain and shoulder pain, and palpitations, GERD, atypical chest pain.\par \par Cardiovascular review of symptoms is negative for exertional chest pain, dyspnea, palpitations, dizziness or syncope.  No PND or orthopnea leg edema.  No bleeding or black stool.\par \par No exercise routine.  Patient has knee pain with limited walking <10 minutes.\par \par Patient had recent elevation in blood pressure started on Toprol 50 mg daily subsequently increased to 100 mg daily.  Average home blood pressures at guideline goal on Toprol 100 mg daily.\par \par Echocardiogram July 2021, LVEF 65%, mild MR, mild to moderate AI, ascending aorta 4.0 cm\par \par Carotid and abdominal ultrasound July 2021 mild nonobstructive plaque, proximal abdominal aorta 3.0 cm unchanged\par \par Lexiscan Myoview stress test March 2021, LVEF 55%, small anterior apical ischemic defect, nonischemic EKG response, no chest pain, baseline sinus rhythm, PRWP, NSST\par \par Echocardiogram March 2021 LVEF 60% mild to moderate MR and AI, mild TR, mild pulmonary hypertension, asending aorta 4.2 cm prior 3.4 cm. \par \par Lexascan Myoview stress test November 2019, LVEF 61%, normal perfusion, diaphragm attenuation seen, no chest pain, no ischemic EKG response, baseline sinus rhythm PRWP NSST\par \par Echocardiogram March 2019, LVEF 65%, mild concentric LVH, mild diastolic dysfunction, mild MR, AI, TR, normal RVSP\par \par SHIV March 2019 normal bilateral study, noncompressible right calf. \par \par Lexascan Myoview stress test November 2017, LDL 58%, normal perfusion, nonischemic EKG response, no anginal symptoms, baseline sinus rhythm PRWP, ASWMI unchanged, NSST\par  \par Lexiscan Myoview stress test April 2016, normal LVEF normal perfusion, nonischemic EKG Baseline sinus rhythm age undetermined anterolateral my, no anginal symptoms, 30\par \par 2-D echo March 2016, LVEF 60%, diastolic dysfunction, mild MR TR.\par \par Carotid and abdominal ultrasound March 2016, normal aortic size nonobstructive findings.\par \par Lexiscan Myoview stress test Feb 2015, normal LVEF normal wall motion, small inferoapical attenuation artifact, baseline NSR, PRWP\par \par 2DEcho Feb 2015, LVEF 60%, mild MR and TR\par \par Carotid and abdominal ultrasound nonobstructive, mid abdominal aorta 3.0cm (prior 1.6cm in 2012)\par \par EKG is sinus rhythm, age-indeterminate septal MI, inferolateral ST-T wave abnormality.\par  [FreeTextEntry2] : abnormal Myovies stress test, atypical chest pain, borderline HTN, borderline dyslipidemia

## 2022-02-02 ENCOUNTER — NON-APPOINTMENT (OUTPATIENT)
Age: 72
End: 2022-02-02

## 2022-02-02 ENCOUNTER — APPOINTMENT (OUTPATIENT)
Dept: CARDIOLOGY | Facility: CLINIC | Age: 72
End: 2022-02-02
Payer: MEDICARE

## 2022-02-02 VITALS
DIASTOLIC BLOOD PRESSURE: 62 MMHG | TEMPERATURE: 97.4 F | HEIGHT: 67 IN | HEART RATE: 75 BPM | BODY MASS INDEX: 31.08 KG/M2 | OXYGEN SATURATION: 94 % | WEIGHT: 198 LBS | SYSTOLIC BLOOD PRESSURE: 118 MMHG

## 2022-02-02 PROCEDURE — 99214 OFFICE O/P EST MOD 30 MIN: CPT

## 2022-02-02 PROCEDURE — 93000 ELECTROCARDIOGRAM COMPLETE: CPT

## 2022-02-02 NOTE — PHYSICAL EXAM
[Well Developed] : well developed [Well Nourished] : well nourished [No Acute Distress] : no acute distress [Normal Venous Pressure] : normal venous pressure [No Carotid Bruit] : no carotid bruit [Normal S1, S2] : normal S1, S2 [No Murmur] : no murmur [No Rub] : no rub [No Gallop] : no gallop [Clear Lung Fields] : clear lung fields [Good Air Entry] : good air entry [No Respiratory Distress] : no respiratory distress  [Soft] : abdomen soft [Non Tender] : non-tender [No Masses/organomegaly] : no masses/organomegaly [Normal Bowel Sounds] : normal bowel sounds [Normal Gait] : normal gait [No Edema] : no edema [No Cyanosis] : no cyanosis [No Clubbing] : no clubbing [No Varicosities] : no varicosities [No Rash] : no rash [No Skin Lesions] : no skin lesions [Moves all extremities] : moves all extremities [No Focal Deficits] : no focal deficits [Normal Speech] : normal speech [Alert and Oriented] : alert and oriented [General Appearance - Well Developed] : well developed [Normal Appearance] : normal appearance [Well Groomed] : well groomed [General Appearance - Well Nourished] : well nourished [No Deformities] : no deformities [General Appearance - In No Acute Distress] : no acute distress [Normal Conjunctiva] : the conjunctiva exhibited no abnormalities [Eyelids - No Xanthelasma] : the eyelids demonstrated no xanthelasmas [Normal Oral Mucosa] : normal oral mucosa [No Oral Pallor] : no oral pallor [No Oral Cyanosis] : no oral cyanosis [Normal Jugular Venous A Waves Present] : normal jugular venous A waves present [Normal Jugular Venous V Waves Present] : normal jugular venous V waves present [No Jugular Venous Omalley A Waves] : no jugular venous omalley A waves [Respiration, Rhythm And Depth] : normal respiratory rhythm and effort [Exaggerated Use Of Accessory Muscles For Inspiration] : no accessory muscle use [Auscultation Breath Sounds / Voice Sounds] : lungs were clear to auscultation bilaterally [Heart Rate And Rhythm] : heart rate and rhythm were normal [Heart Sounds] : normal S1 and S2 [Murmurs] : no murmurs present [Abdomen Soft] : soft [Abdomen Tenderness] : non-tender [Abdomen Mass (___ Cm)] : no abdominal mass palpated [Abnormal Walk] : normal gait [Gait - Sufficient For Exercise Testing] : the gait was sufficient for exercise testing [Nail Clubbing] : no clubbing of the fingernails [Cyanosis, Localized] : no localized cyanosis [Petechial Hemorrhages (___cm)] : no petechial hemorrhages [Skin Color & Pigmentation] : normal skin color and pigmentation [] : no rash [No Venous Stasis] : no venous stasis [Skin Lesions] : no skin lesions [No Skin Ulcers] : no skin ulcer [No Xanthoma] : no  xanthoma was observed [Oriented To Time, Place, And Person] : oriented to person, place, and time [Affect] : the affect was normal [Mood] : the mood was normal [No Anxiety] : not feeling anxious [de-identified] : Mild memory loss

## 2022-02-02 NOTE — ASSESSMENT
[FreeTextEntry1] : Korey is a 71-year-old male with medical history detailed above and active medical issues including: \par  \par - Dyspnea on exertion, abnormal Myoview stress test, abnormal baseline EKG, multiple CAD risk factors.  Nonobstructive coronary CTA with moderate risk coronary calcium score 293. \par \par - Hypertension . Patient had recent elevation in blood pressure started on Toprol 50 mg daily subsequently increased to 100 mg daily with average resting home BPs at guideline goal\par \par - Hyperlipidemia on atorvastatin well-tolerated, follow-up labs with PCP \par \par - Arthritis, knee pain and shoulder pain, limited exercise capacity.\par \par - Left leg claudication,  SHIV ordered\par \par - Patient followed by neurology for Alzheimer's dementia\par \par - Family history of premature coronary artery disease.\par \par Advised patient to follow active lifestyle with regular cardiovascular exercise. Patient educated on lifestyle and diet modification with low sodium low fat diet and avoidance of excessive alcohol. Patient is aware to call with any symptoms or concerns. \par \par Patient will be seen in cardiology follow-up 6 months same-day echocardiogram and Doppler studies..  Current cardiac medications remain unchanged. Repeat labs will be ordered with PMD.\par \par Korey will followup with Dr Mack Mancera for primary care\par

## 2022-02-02 NOTE — REASON FOR VISIT
[Other: ____] : [unfilled] [Follow-Up - Clinic] : a clinic follow-up of [FreeTextEntry1] : Korey is a 71-year-old male with a history of borderline hypertension, borderline dyslipidemia, Alzheimer's dementia, arthritis, knee pain and shoulder pain, and palpitations, GERD, atypical chest pain.\par \par Cardiovascular review of symptoms is negative for exertional chest pain, dyspnea, palpitations, dizziness or syncope.  No PND or orthopnea leg edema.  No bleeding or black stool.\par \par No exercise routine.  Patient has knee pain with limited walking 15 minutes.\par \par Patient had recent elevation in blood pressure started on Toprol 50 mg daily subsequently increased to 100 mg daily with average resting home blood pressure at guideline goal. \par \par Labs Feb 2022 normal BMP, CBC, LFT, TSH, HbA1c 6.0, total cholesterol 135, LDL 75, HDL 36, triglyceride 121 \par \par Coronary CTA  April 2021, nonobstructive coronaries, LVEF 55%, coronary calcium score 293, \par \par Lexiscan Myoview stress test March 2021, LVEF 55%, small anterior apical ischemic defect, nonischemic EKG response, no chest pain, baseline sinus rhythm, PRWP, NSST\par \par Echocardiogram March 2021 LVEF 60% mild to moderate MR and AI, mild TR, mild pulmonary hypertension, asending aorta 4.2 cm prior 3.4 cm. \par \par Lexascan Myoview stress test November 2019, LVEF 61%, normal perfusion, diaphragm attenuation seen, no chest pain, no ischemic EKG response, baseline sinus rhythm PRWP NSST\par \par Echocardiogram March 2019, LVEF 65%, mild concentric LVH, mild diastolic dysfunction, mild MR, AI, TR, normal RVSP\par \par SHIV March 2019 normal bilateral study, noncompressible right calf. \par \par Lexascan Myoview stress test November 2017, LDL 58%, normal perfusion, nonischemic EKG response, no anginal symptoms, baseline sinus rhythm PRWP, ASWMI unchanged, NSST\par  \par Lexiscan Myoview stress test April 2016, normal LVEF normal perfusion, nonischemic EKG Baseline sinus rhythm age undetermined anterolateral my, no anginal symptoms, 30\par \par 2-D echo March 2016, LVEF 60%, diastolic dysfunction, mild MR TR.\par \par Carotid and abdominal ultrasound March 2016, normal aortic size nonobstructive findings.\par \par Lexiscan Myoview stress test Feb 2015, normal LVEF normal wall motion, small inferoapical attenuation artifact, baseline NSR, PRWP\par \par 2DEcho Feb 2015, LVEF 60%, mild MR and TR\par \par Carotid and abdominal ultrasound nonobstructive, mid abdominal aorta 3.0cm (prior 1.6cm in 2012)\par \par EKG is sinus rhythm, age-indeterminate septal MI, inferolateral ST-T wave abnormality.\par  [FreeTextEntry2] : abnormal Myovies stress test, atypical chest pain, borderline HTN, borderline dyslipidemia

## 2022-08-04 ENCOUNTER — APPOINTMENT (OUTPATIENT)
Dept: CARDIOLOGY | Facility: CLINIC | Age: 72
End: 2022-08-04

## 2022-08-11 ENCOUNTER — APPOINTMENT (OUTPATIENT)
Dept: CARDIOLOGY | Facility: CLINIC | Age: 72
End: 2022-08-11

## 2022-08-11 PROCEDURE — 93306 TTE W/DOPPLER COMPLETE: CPT

## 2022-08-11 PROCEDURE — 93880 EXTRACRANIAL BILAT STUDY: CPT

## 2022-08-11 PROCEDURE — 93979 VASCULAR STUDY: CPT

## 2022-08-30 ENCOUNTER — APPOINTMENT (OUTPATIENT)
Dept: CARDIOLOGY | Facility: CLINIC | Age: 72
End: 2022-08-30

## 2022-08-30 VITALS
OXYGEN SATURATION: 95 % | DIASTOLIC BLOOD PRESSURE: 60 MMHG | TEMPERATURE: 98 F | HEART RATE: 74 BPM | SYSTOLIC BLOOD PRESSURE: 114 MMHG | HEIGHT: 67 IN | WEIGHT: 185 LBS | BODY MASS INDEX: 29.03 KG/M2

## 2022-08-30 PROCEDURE — 99215 OFFICE O/P EST HI 40 MIN: CPT

## 2022-08-30 NOTE — HISTORY OF PRESENT ILLNESS
[FreeTextEntry1] : POORNIMA AMAYA is a 71 year old male with a past medical history of HTN, HLD, Alzheimer's dementia, arthritis, knee pain and shoulder pain, and palpitations, GERD, atypical chest pain.\par \par Last seen 2/2/22. In the interim there have been no hospitalizations or procedures. Reports an exertional chest pressure and dyspnea. Reports a decrease in exercise tolerance. Denies palpitations, dizziness, lightheadedness, syncope, claudication, and edema. Remote smoking hx. Tries to exercise on stationary bike, but activity is limited secondary to hip pain. Walks with cane.\par \par /64 on my exam.\par \par Testing:\par \par Labs 8/1/22: Na 140, K 4.6, Cr 0.66, Ca 9.4, AST 18, ALT 16, Trigs 108, HDL 39, LDL 87, Chol 148, A1C5.5PSA 0.41\par \par Abd u/s 8/11/22: Mild abd aortic aneurysm noted, measuring 3.3 cm at its largest diameter. Mod plaque. TDS due to overlying bowel gas pattern. Compared to previous study done 7/1/21, AAA noted in mid potion. Prox aorta dimensions slightly smaller than previous study.\par \par Echo 8/11/22: EF 55%. Mild to mod MR. Mild to mod AR. Ascending aorta 4 cm. Moderately dilated LA. Mild concentric LVH. Normal wall motion. Indeterminate LV diastolic function. Mild ANTONIO. Mild PA. Compared with echo 7/1/21, increase in LAE, ild LVH, and mild ANTONIO noted.\par \par Carotid u/s 8/11/22: Mild nonobstructive carotid dz seen BL. TDS due to pt body habitus., BL distal ICAs are not well visualized.\par \par \par Labs 2/1/22: PSA 0.69, Na 139, K 4.3, Cr 0.63, Ca 9, AST 24, ALT 21, Trigs 121, HDL 36, LDL 75, Chol 135, TSH 2.44, A1C 6, WBC 4.96, Hgb 12.8, HCT 39.7, plt 162.\par \par SHIV 4/14/21: Normal study BL. \par \par CT Cors 4/10/21: Total calcium score 293. Atherosclerotic changes with no hemodynamically significant stenoses identified.\par \par [facility] 3/24/21: Lexiscan, neg by ekg, Small defects in apical anterior, apical lateral walls that are reversible, suggestive of ischemia, EF 55%.

## 2022-08-30 NOTE — DISCUSSION/SUMMARY
[FreeTextEntry1] : POORNIMA AMAYA is a 71 year old M who presents today Aug 30, 2022 with the above history and the following active issues:\par \par Chest pain. [place]. Multiple CAD risk factors. Nonobstructive coronary CTA with moderate risk coronary calcium score 293. However sx's persist. Recommend left heart cath. On ASA, statin, and betablocker.\par Above testing has been reviewed with the patient. \par High risk noninvasive imaging. \par Discussed risk of major adverse cardiovascular events without further evaluation and management. \par Recommend cardiac catheterization.\par Discussed the risks, benefits, alternatives of invasive angiography.\par Discussed potential for revascularization, percutaneous v surgical\par All questions answered.\par If escalating sx or sx at rest then 911\par Daily aspirin therapy. \par Beta blocker therapy.  \par \par - Hypertension. Patient had prior elevation in blood pressure started on Toprol 50 mg daily subsequently increased to 100 mg daily with average resting home BPs at guideline goal\par \par - Hyperlipidemia on atorvastatin well-tolerated, follow-up labs with PCP \par \par - AAA now 3.3 cm. Recommend repeat abd u/s in 6 months.\par \par - Arthritis, knee pain and shoulder pain, limited exercise capacity.\par \par - Patient followed by neurology for Alzheimer's dementia. Patient appropriate today in the office.\par \par - Family history of premature coronary artery disease.\par \par Ongoing f/u with PCP.\par \par F/U after testing to review results (cath, OV, abd u/s in 6 months).\par Discussed red flag symptoms, which would warrant sooner or emergent medical evaluation.\par Any questions and concerns were addressed and resolved.\par \par Sincerely,\par Claire Keller [facility]\par Patient's history, testing, and plan was reviewed with supervising physician, Dr. Patrick Valentino

## 2022-09-15 ENCOUNTER — APPOINTMENT (OUTPATIENT)
Dept: CARDIOLOGY | Facility: CLINIC | Age: 72
End: 2022-09-15

## 2022-09-15 VITALS
BODY MASS INDEX: 29.03 KG/M2 | HEIGHT: 67 IN | SYSTOLIC BLOOD PRESSURE: 116 MMHG | WEIGHT: 185 LBS | HEART RATE: 87 BPM | DIASTOLIC BLOOD PRESSURE: 74 MMHG | TEMPERATURE: 98.2 F | OXYGEN SATURATION: 97 %

## 2022-09-15 PROCEDURE — 93242 EXT ECG>48HR<7D RECORDING: CPT | Mod: 59

## 2022-09-15 PROCEDURE — 99215 OFFICE O/P EST HI 40 MIN: CPT

## 2022-09-15 RX ORDER — METOPROLOL TARTRATE 50 MG/1
50 TABLET, FILM COATED ORAL
Qty: 90 | Refills: 0 | Status: DISCONTINUED | COMMUNITY

## 2022-09-15 NOTE — PHYSICAL EXAM
[Well Developed] : well developed [Well Nourished] : well nourished [No Acute Distress] : no acute distress [Normal Venous Pressure] : normal venous pressure [No Carotid Bruit] : no carotid bruit [Normal S1, S2] : normal S1, S2 [No Murmur] : no murmur [No Rub] : no rub [No Gallop] : no gallop [Clear Lung Fields] : clear lung fields [Good Air Entry] : good air entry [No Respiratory Distress] : no respiratory distress  [Soft] : abdomen soft [Non Tender] : non-tender [No Masses/organomegaly] : no masses/organomegaly [Normal Bowel Sounds] : normal bowel sounds [Normal Gait] : normal gait [No Edema] : no edema [No Cyanosis] : no cyanosis [No Clubbing] : no clubbing [No Varicosities] : no varicosities [No Rash] : no rash [No Skin Lesions] : no skin lesions [Moves all extremities] : moves all extremities [No Focal Deficits] : no focal deficits [Normal Speech] : normal speech [Alert and Oriented] : alert and oriented [General Appearance - Well Developed] : well developed [Normal Appearance] : normal appearance [Well Groomed] : well groomed [General Appearance - Well Nourished] : well nourished [No Deformities] : no deformities [General Appearance - In No Acute Distress] : no acute distress [Normal Conjunctiva] : the conjunctiva exhibited no abnormalities [Eyelids - No Xanthelasma] : the eyelids demonstrated no xanthelasmas [Normal Oral Mucosa] : normal oral mucosa [No Oral Pallor] : no oral pallor [No Oral Cyanosis] : no oral cyanosis [Normal Jugular Venous A Waves Present] : normal jugular venous A waves present [Normal Jugular Venous V Waves Present] : normal jugular venous V waves present [No Jugular Venous Omalley A Waves] : no jugular venous omalley A waves [Respiration, Rhythm And Depth] : normal respiratory rhythm and effort [Exaggerated Use Of Accessory Muscles For Inspiration] : no accessory muscle use [Auscultation Breath Sounds / Voice Sounds] : lungs were clear to auscultation bilaterally [Heart Rate And Rhythm] : heart rate and rhythm were normal [Heart Sounds] : normal S1 and S2 [Murmurs] : no murmurs present [Abdomen Soft] : soft [Abdomen Tenderness] : non-tender [Abdomen Mass (___ Cm)] : no abdominal mass palpated [Abnormal Walk] : normal gait [Gait - Sufficient For Exercise Testing] : the gait was sufficient for exercise testing [Nail Clubbing] : no clubbing of the fingernails [Cyanosis, Localized] : no localized cyanosis [Petechial Hemorrhages (___cm)] : no petechial hemorrhages [Skin Color & Pigmentation] : normal skin color and pigmentation [] : no rash [No Venous Stasis] : no venous stasis [Skin Lesions] : no skin lesions [No Skin Ulcers] : no skin ulcer [No Xanthoma] : no  xanthoma was observed [Oriented To Time, Place, And Person] : oriented to person, place, and time [Affect] : the affect was normal [Mood] : the mood was normal [No Anxiety] : not feeling anxious [de-identified] : Mild memory loss

## 2022-09-15 NOTE — REASON FOR VISIT
[Other: ____] : [unfilled] [Follow-Up - Clinic] : a clinic follow-up of [FreeTextEntry1] : Korey is a 71-year-old male with a history of borderline hypertension, borderline dyslipidemia, Alzheimer's dementia, arthritis, knee pain and shoulder pain, and palpitations, GERD, atypical chest pain.\par \par Patient admitted [facility] 9/1/2022 atypical chest pain normal evaluation discharged for cardiology follow-up.  No further chest pain. Patient states he had bradycardia while sleeping and Lopressor reduced to 50 mg daily. \par \par Cardiovascular review of symptoms is negative for exertional chest pain, dyspnea, palpitations, dizziness or syncope.  No PND or orthopnea leg edema.  No bleeding or black stool.\par \par No exercise routine.  Patient has knee pain with limited walking 15 minutes.\par \par Patient had recent elevation in blood pressure started on Toprol 50 mg daily subsequently increased to 100 mg daily with average resting home blood pressure at guideline goal. \par \par Labs Feb 2022 normal BMP, CBC, LFT, TSH, HbA1c 6.0, total cholesterol 135, LDL 75, HDL 36, triglyceride 121 \par \par Coronary CTA  April 2021, nonobstructive coronaries, LVEF 55%, coronary calcium score 293, \par \par Lexiscan Myoview stress test March 2021, LVEF 55%, small anterior apical ischemic defect, nonischemic EKG response, no chest pain, baseline sinus rhythm, PRWP, NSST\par \par Echocardiogram March 2021 LVEF 60% mild to moderate MR and AI, mild TR, mild pulmonary hypertension, asending aorta 4.2 cm prior 3.4 cm. \par \par Lexascan Myoview stress test November 2019, LVEF 61%, normal perfusion, diaphragm attenuation seen, no chest pain, no ischemic EKG response, baseline sinus rhythm PRWP NSST\par \par Echocardiogram March 2019, LVEF 65%, mild concentric LVH, mild diastolic dysfunction, mild MR, AI, TR, normal RVSP\par \par SHIV March 2019 normal bilateral study, noncompressible right calf. \par \par Lexascan Myoview stress test November 2017, LDL 58%, normal perfusion, nonischemic EKG response, no anginal symptoms, baseline sinus rhythm PRWP, ASWMI unchanged, NSST\par  \par Lexiscan Myoview stress test April 2016, normal LVEF normal perfusion, nonischemic EKG Baseline sinus rhythm age undetermined anterolateral my, no anginal symptoms, 30\par \par 2-D echo March 2016, LVEF 60%, diastolic dysfunction, mild MR TR.\par \par Carotid and abdominal ultrasound March 2016, normal aortic size nonobstructive findings.\par \par Lexiscan Myoview stress test Feb 2015, normal LVEF normal wall motion, small inferoapical attenuation artifact, baseline NSR, PRWP\par \par 2DEcho Feb 2015, LVEF 60%, mild MR and TR\par \par Carotid and abdominal ultrasound nonobstructive, mid abdominal aorta 3.0cm (prior 1.6cm in 2012)\par \par EKG is sinus rhythm, age-indeterminate septal MI, inferolateral ST-T wave abnormality.\par  [FreeTextEntry2] : abnormal Myovies stress test, atypical chest pain, borderline HTN, borderline dyslipidemia

## 2022-09-15 NOTE — ASSESSMENT
[FreeTextEntry1] : Korey is a 71-year-old male with medical history detailed above and active medical issues including: \par  \par -Atypical chest pain, abnormal Myoview stress test, abnormal baseline EKG, multiple CAD risk factors.  Nonobstructive coronary CTA with moderate risk coronary calcium score 293  April 2021\par \par - Sinus bradycardia, Toprol reduced to 50 Mg daily, Zio patch 1 week heart monitor started today. \par \par - Hypertension.  Average resting home BPs at guideline goal on Toprol 50 Mg daily\par \par - Hyperlipidemia on atorvastatin well-tolerated, follow-up labs with PCP \par \par - Arthritis, knee pain and shoulder pain, limited exercise capacity.\par \par - Left leg claudication,  SHIV ordered\par \par - Patient followed by neurology for Alzheimer's dementia\par \par - Family history of premature coronary artery disease.\par \par Advised patient to follow active lifestyle with regular cardiovascular exercise. Patient educated on lifestyle and diet modification with low sodium low fat diet and avoidance of excessive alcohol. Patient is aware to call with any symptoms or concerns. \par \par Patient will be seen in cardiology follow-up 6 months.  Current cardiac medications remain unchanged. Repeat labs will be ordered with PMD.\par \par Korey will followup with Dr Mack Mancera for primary care\par

## 2022-09-19 ENCOUNTER — APPOINTMENT (OUTPATIENT)
Dept: CARDIOLOGY | Facility: CLINIC | Age: 72
End: 2022-09-19

## 2022-09-19 PROCEDURE — 93242 EXT ECG>48HR<7D RECORDING: CPT

## 2022-10-03 ENCOUNTER — APPOINTMENT (OUTPATIENT)
Dept: CARDIOLOGY | Facility: CLINIC | Age: 72
End: 2022-10-03

## 2022-10-03 PROCEDURE — ZZZZZ: CPT

## 2022-10-03 PROCEDURE — 93244 EXT ECG>48HR<7D REV&INTERPJ: CPT

## 2022-11-02 ENCOUNTER — APPOINTMENT (OUTPATIENT)
Dept: CARDIOLOGY | Facility: CLINIC | Age: 72
End: 2022-11-02

## 2022-11-02 PROCEDURE — 99214 OFFICE O/P EST MOD 30 MIN: CPT | Mod: 95

## 2022-11-02 RX ORDER — MELOXICAM 7.5 MG/1
7.5 TABLET ORAL
Qty: 30 | Refills: 0 | Status: ACTIVE | COMMUNITY
Start: 2022-10-19

## 2022-11-02 NOTE — PHYSICAL EXAM
[Well Developed] : well developed [Well Nourished] : well nourished [No Acute Distress] : no acute distress [Normal Venous Pressure] : normal venous pressure [No Carotid Bruit] : no carotid bruit [Normal S1, S2] : normal S1, S2 [No Murmur] : no murmur [No Rub] : no rub [No Gallop] : no gallop [Clear Lung Fields] : clear lung fields [Good Air Entry] : good air entry [No Respiratory Distress] : no respiratory distress  [Soft] : abdomen soft [Non Tender] : non-tender [No Masses/organomegaly] : no masses/organomegaly [Normal Bowel Sounds] : normal bowel sounds [Normal Gait] : normal gait [No Edema] : no edema [No Cyanosis] : no cyanosis [No Clubbing] : no clubbing [No Varicosities] : no varicosities [No Rash] : no rash [No Skin Lesions] : no skin lesions [Moves all extremities] : moves all extremities [No Focal Deficits] : no focal deficits [Normal Speech] : normal speech [Alert and Oriented] : alert and oriented [General Appearance - Well Developed] : well developed [Normal Appearance] : normal appearance [Well Groomed] : well groomed [General Appearance - Well Nourished] : well nourished [No Deformities] : no deformities [General Appearance - In No Acute Distress] : no acute distress [Normal Conjunctiva] : the conjunctiva exhibited no abnormalities [Eyelids - No Xanthelasma] : the eyelids demonstrated no xanthelasmas [Normal Oral Mucosa] : normal oral mucosa [No Oral Pallor] : no oral pallor [No Oral Cyanosis] : no oral cyanosis [Normal Jugular Venous A Waves Present] : normal jugular venous A waves present [Normal Jugular Venous V Waves Present] : normal jugular venous V waves present [No Jugular Venous Omalley A Waves] : no jugular venous omalley A waves [Respiration, Rhythm And Depth] : normal respiratory rhythm and effort [Exaggerated Use Of Accessory Muscles For Inspiration] : no accessory muscle use [Auscultation Breath Sounds / Voice Sounds] : lungs were clear to auscultation bilaterally [Heart Rate And Rhythm] : heart rate and rhythm were normal [Heart Sounds] : normal S1 and S2 [Murmurs] : no murmurs present [Abdomen Soft] : soft [Abdomen Tenderness] : non-tender [Abdomen Mass (___ Cm)] : no abdominal mass palpated [Abnormal Walk] : normal gait [Gait - Sufficient For Exercise Testing] : the gait was sufficient for exercise testing [Nail Clubbing] : no clubbing of the fingernails [Cyanosis, Localized] : no localized cyanosis [Petechial Hemorrhages (___cm)] : no petechial hemorrhages [Skin Color & Pigmentation] : normal skin color and pigmentation [] : no rash [No Venous Stasis] : no venous stasis [Skin Lesions] : no skin lesions [No Skin Ulcers] : no skin ulcer [No Xanthoma] : no  xanthoma was observed [Oriented To Time, Place, And Person] : oriented to person, place, and time [Mood] : the mood was normal [Affect] : the affect was normal [No Anxiety] : not feeling anxious [de-identified] : Mild memory loss

## 2022-11-02 NOTE — REASON FOR VISIT
[Other: ____] : [unfilled] [Follow-Up - Clinic] : a clinic follow-up of [FreeTextEntry1] : Korey is a 72-year-old male with a history of borderline hypertension, borderline dyslipidemia, Alzheimer's dementia, arthritis, knee pain and shoulder pain, and palpitations, GERD, atypical chest pain.\par \par Patient admitted [facility] 9/1/2022 atypical chest pain normal evaluation discharged for cardiology follow-up.  No further chest pain. Patient states he had bradycardia while sleeping and Lopressor reduced to 50 mg daily. \par \par Cardiovascular review of symptoms is negative for exertional chest pain, dyspnea, palpitations, dizziness or syncope.  No PND or orthopnea leg edema.  No bleeding or black stool.\par \par No exercise routine.  Patient has knee pain with limited walking 15 minutes.\par \par Patient had recent elevation in blood pressure started on Toprol 50 mg daily subsequently increased to 100 mg daily with average resting home blood pressure at guideline goal. \par \par Zio patch 1 week heart monitor Sept 2022 sinus rhythm average heart rate 76, brief SVT, no AF\par \par Labs Feb 2022 normal BMP, CBC, LFT, TSH, HbA1c 6.0, total cholesterol 135, LDL 75, HDL 36, triglyceride 121 \par \par Coronary CTA  April 2021, nonobstructive coronaries, LVEF 55%, coronary calcium score 293, \par \par Lexiscan Myoview stress test March 2021, LVEF 55%, small anterior apical ischemic defect, nonischemic EKG response, no chest pain, baseline sinus rhythm, PRWP, NSST\par \par Echocardiogram March 2021 LVEF 60% mild to moderate MR and AI, mild TR, mild pulmonary hypertension, asending aorta 4.2 cm prior 3.4 cm. \par \par Lexascan Myoview stress test November 2019, LVEF 61%, normal perfusion, diaphragm attenuation seen, no chest pain, no ischemic EKG response, baseline sinus rhythm PRWP NSST\par \par Echocardiogram March 2019, LVEF 65%, mild concentric LVH, mild diastolic dysfunction, mild MR, AI, TR, normal RVSP\par \par SHIV March 2019 normal bilateral study, noncompressible right calf. \par \par Lexascan Myoview stress test November 2017, LDL 58%, normal perfusion, nonischemic EKG response, no anginal symptoms, baseline sinus rhythm PRWP, ASWMI unchanged, NSST\par  \par Lexiscan Myoview stress test April 2016, normal LVEF normal perfusion, nonischemic EKG Baseline sinus rhythm age undetermined anterolateral my, no anginal symptoms, 30\par \par 2-D echo March 2016, LVEF 60%, diastolic dysfunction, mild MR TR.\par \par Carotid and abdominal ultrasound March 2016, normal aortic size nonobstructive findings.\par \par Lexiscan Myoview stress test Feb 2015, normal LVEF normal wall motion, small inferoapical attenuation artifact, baseline NSR, PRWP\par \par 2DEcho Feb 2015, LVEF 60%, mild MR and TR\par \par Carotid and abdominal ultrasound nonobstructive, mid abdominal aorta 3.0cm (prior 1.6cm in 2012)\par \par EKG is sinus rhythm, age-indeterminate septal MI, inferolateral ST-T wave abnormality.\par  [FreeTextEntry2] : abnormal Myovies stress test, atypical chest pain, borderline HTN, borderline dyslipidemia

## 2022-11-02 NOTE — ASSESSMENT
[FreeTextEntry1] : Korey is a 71-year-old male with medical history detailed above and active medical issues including: \par  \par -Atypical chest pain, abnormal Myoview stress test, abnormal baseline EKG, multiple CAD risk factors.  Nonobstructive coronary CTA with moderate risk coronary calcium score 293  April 2021\par \par - Sinus bradycardia, Toprol reduced to 50 Mg daily, Zio patch 1 week heart monitor Sept 2022 sinus rhythm, brief SVT no AF. \par \par - Hypertension.  Average resting home BPs at guideline goal on Toprol 50 Mg daily\par \par - Hyperlipidemia on atorvastatin well-tolerated, follow-up labs with PCP \par \par - Arthritis, knee pain and shoulder pain, limited exercise capacity.\par \par - Left leg claudication,  SHIV ordered\par \par - Patient followed by neurology for Alzheimer's dementia\par \par - Family history of premature coronary artery disease.\par \par Advised patient to follow active lifestyle with regular cardiovascular exercise. Patient educated on lifestyle and diet modification with low sodium low fat diet and avoidance of excessive alcohol. Patient is aware to call with any symptoms or concerns. \par \par Patient will be seen in cardiology follow-up 3 months.  Current cardiac medications remain unchanged. Repeat labs will be ordered with PMD.\par \par Korey will followup with Dr Mack Mancera for primary care\par

## 2022-12-22 ENCOUNTER — NON-APPOINTMENT (OUTPATIENT)
Age: 72
End: 2022-12-22

## 2023-01-04 ENCOUNTER — APPOINTMENT (OUTPATIENT)
Dept: CARDIOLOGY | Facility: CLINIC | Age: 73
End: 2023-01-04
Payer: MEDICARE

## 2023-01-04 VITALS
BODY MASS INDEX: 31.08 KG/M2 | HEIGHT: 67 IN | OXYGEN SATURATION: 97 % | SYSTOLIC BLOOD PRESSURE: 110 MMHG | DIASTOLIC BLOOD PRESSURE: 70 MMHG | HEART RATE: 63 BPM | TEMPERATURE: 97.3 F | WEIGHT: 198 LBS

## 2023-01-04 PROCEDURE — 99215 OFFICE O/P EST HI 40 MIN: CPT

## 2023-01-04 RX ORDER — METOPROLOL SUCCINATE 50 MG/1
50 TABLET, EXTENDED RELEASE ORAL DAILY
Qty: 90 | Refills: 3 | Status: DISCONTINUED | COMMUNITY
Start: 2022-09-15 | End: 2023-01-04

## 2023-01-04 RX ORDER — ASPIRIN 81 MG
81 TABLET,CHEWABLE ORAL
Qty: 30 | Refills: 0 | Status: ACTIVE | COMMUNITY
Start: 2022-08-30

## 2023-01-04 RX ORDER — MEMANTINE HYDROCHLORIDE 10 MG/1
10 TABLET, FILM COATED ORAL
Qty: 60 | Refills: 0 | Status: DISCONTINUED | COMMUNITY
Start: 2022-02-16 | End: 2023-01-04

## 2023-01-04 NOTE — REASON FOR VISIT
[Initial Evaluation] : an initial evaluation of [Medication Management] : Medication management [Peripheral Vascular Disease] : peripheral vascular disease

## 2023-01-04 NOTE — HISTORY OF PRESENT ILLNESS
[FreeTextEntry1] : \par 71 yo M\par Lower extremity pain\par AAA \par Nonobstructive coronary artery disease\par MARC \par Alzheimer's dementia\par Reflux\par \par 1/2023 initial vascular visit: his symptoms appear neuropathic in nature involving soles of feet. limited by knee pain and hip pain. his shiv 1.5 years ago had triphasic waveforms bilaterally. no other cardiovascular symptoms. 2+ distal pulses on exam. \par \par \par TESTING I REVIEWED TODAY:\par \par 8/2022 3.3 cm AAA. no sig carotid disease. TTE reviewed asc ao 4 cm. . \par SHIV 2021 normal waveforms.\par \par Zio patch 1 week heart monitor Sept 2022 sinus rhythm average heart rate 76, brief SVT, no AF\par \par Labs Feb 2022 normal BMP, CBC, LFT, TSH, HbA1c 6.0, total cholesterol 135, LDL 75, HDL 36, triglyceride 121 \par \par Coronary CTA April 2021, nonobstructive coronaries, LVEF 55%, coronary calcium score 293, \par \par Lexiscan Myoview stress test March 2021, LVEF 55%, small anterior apical ischemic defect, nonischemic EKG response, no chest pain, baseline sinus rhythm, PRWP, NSST\par \par Echocardiogram March 2021 LVEF 60% mild to moderate MR and AI, mild TR, mild pulmonary hypertension, asending aorta 4.2 cm prior 3.4 cm. \par \par Lexascan Myoview stress test November 2019, LVEF 61%, normal perfusion, diaphragm attenuation seen, no chest pain, no ischemic EKG response, baseline sinus rhythm PRWP NSST\par \par Echocardiogram March 2019, LVEF 65%, mild concentric LVH, mild diastolic dysfunction, mild MR, AI, TR, normal RVSP\par \par SHIV March 2019 normal bilateral study, noncompressible right calf. \par \par Lexascan Myoview stress test November 2017, LDL 58%, normal perfusion, nonischemic EKG response, no anginal symptoms, baseline sinus rhythm PRWP, ASWMI unchanged, NSST\par  \par Lexiscan Myoview stress test April 2016, normal LVEF normal perfusion, nonischemic EKG Baseline sinus rhythm age undetermined anterolateral my, no anginal symptoms, 30\par \par 2-D echo March 2016, LVEF 60%, diastolic dysfunction, mild MR TR.\par \par Carotid and abdominal ultrasound March 2016, normal aortic size nonobstructive findings.\par \par Lexiscan Myoview stress test Feb 2015, normal LVEF normal wall motion, small inferoapical attenuation artifact, baseline NSR, PRWP\par

## 2023-01-04 NOTE — PHYSICAL EXAM
[General Appearance - Well Developed] : well developed [Normal Appearance] : normal appearance [FreeTextEntry1] : 2+ pulses

## 2023-01-04 NOTE — DISCUSSION/SUMMARY
[FreeTextEntry1] : \par # Lower extremity pain: his symptoms appear neuropathic in nature involving soles of feet. his shiv 1.5 years ago had triphasic waveforms bilaterally. no other cardiovascular symptoms. 2+ distal pulses on exam. \par - I will confirm with SHIV and duplex imaging. I recommend neurologic evaluation. \par \par # AAA: stable sizes last checked 8/2022. statin/aspirin on board. bp optimization. needs yearly. recently increased statin. serial labwork. \par \par # Nonobstructive coronary artery disease: as above. \par \par # MARC: he reports pending cpap initation\par \par # Alzheimer's dementia\par \par \par Follow after testing.  ER precautions given to patient.\par

## 2023-01-24 ENCOUNTER — NON-APPOINTMENT (OUTPATIENT)
Age: 73
End: 2023-01-24

## 2023-01-24 ENCOUNTER — APPOINTMENT (OUTPATIENT)
Dept: CARDIOLOGY | Facility: CLINIC | Age: 73
End: 2023-01-24
Payer: MEDICARE

## 2023-01-24 PROCEDURE — 93925 LOWER EXTREMITY STUDY: CPT

## 2023-02-01 ENCOUNTER — APPOINTMENT (OUTPATIENT)
Dept: CARDIOLOGY | Facility: CLINIC | Age: 73
End: 2023-02-01

## 2023-02-08 ENCOUNTER — APPOINTMENT (OUTPATIENT)
Dept: CARDIOLOGY | Facility: CLINIC | Age: 73
End: 2023-02-08
Payer: MEDICARE

## 2023-02-08 PROCEDURE — 93923 UPR/LXTR ART STDY 3+ LVLS: CPT

## 2023-02-11 ENCOUNTER — RX CHANGE (OUTPATIENT)
Age: 73
End: 2023-02-11

## 2023-02-15 ENCOUNTER — APPOINTMENT (OUTPATIENT)
Dept: CARDIOLOGY | Facility: CLINIC | Age: 73
End: 2023-02-15
Payer: MEDICARE

## 2023-02-15 VITALS
WEIGHT: 195 LBS | TEMPERATURE: 96.8 F | BODY MASS INDEX: 30.61 KG/M2 | OXYGEN SATURATION: 96 % | HEART RATE: 65 BPM | HEIGHT: 67 IN | SYSTOLIC BLOOD PRESSURE: 108 MMHG | DIASTOLIC BLOOD PRESSURE: 64 MMHG

## 2023-02-15 DIAGNOSIS — M17.10 UNILATERAL PRIMARY OSTEOARTHRITIS, UNSPECIFIED KNEE: ICD-10-CM

## 2023-02-15 PROCEDURE — 99214 OFFICE O/P EST MOD 30 MIN: CPT

## 2023-02-15 RX ORDER — OMEPRAZOLE 20 MG/1
20 CAPSULE, DELAYED RELEASE ORAL
Refills: 0 | Status: ACTIVE | COMMUNITY
Start: 2017-10-03

## 2023-02-15 RX ORDER — FINASTERIDE 5 MG/1
5 TABLET, FILM COATED ORAL DAILY
Refills: 0 | Status: ACTIVE | COMMUNITY
Start: 2017-11-20

## 2023-02-15 NOTE — HISTORY OF PRESENT ILLNESS
[FreeTextEntry1] : \par 73 yo M\par Lower extremity pain nonvascular, appears neuropathic,  mild PAD. \par AAA \par Nonobstructive coronary artery disease\par MARC \par Alzheimer's dementia\par Reflux\par \par 2/2023 VISIT: Testing with preserved vascular supply to lower extremities with normal SHIV and mild abnormalities on duplex. still has soles of feet pain. \par \par 1/2023 initial vascular visit: his symptoms appear neuropathic in nature involving soles of feet. limited by knee pain and hip pain. his shiv 1.5 years ago had triphasic waveforms bilaterally. no other cardiovascular symptoms. 2+ distal pulses on exam. \par \par \par TESTING I REVIEWED TODAY:\par \par 2/2023:\par LABWORK: cmp, lipid,ck\par DUPLEX LOWER EXTREMITES: MILD ATHERO\par SHIV: preserved bilaterally\par \par 8/2022 3.3 cm AAA. no sig carotid disease. TTE reviewed asc ao 4 cm. . \par SHIV 2021 normal waveforms.\par \par Zio patch 1 week heart monitor Sept 2022 sinus rhythm average heart rate 76, brief SVT, no AF\par \par Labs Feb 2022 normal BMP, CBC, LFT, TSH, HbA1c 6.0, total cholesterol 135, LDL 75, HDL 36, triglyceride 121 \par \par Coronary CTA April 2021, nonobstructive coronaries, LVEF 55%, coronary calcium score 293, \par \par Lexiscan Myoview stress test March 2021, LVEF 55%, small anterior apical ischemic defect, nonischemic EKG response, no chest pain, baseline sinus rhythm, PRWP, NSST\par \par Echocardiogram March 2021 LVEF 60% mild to moderate MR and AI, mild TR, mild pulmonary hypertension, asending aorta 4.2 cm prior 3.4 cm. \par \par Lexascan Myoview stress test November 2019, LVEF 61%, normal perfusion, diaphragm attenuation seen, no chest pain, no ischemic EKG response, baseline sinus rhythm PRWP NSST\par \par Echocardiogram March 2019, LVEF 65%, mild concentric LVH, mild diastolic dysfunction, mild MR, AI, TR, normal RVSP\par \par SHIV March 2019 normal bilateral study, noncompressible right calf. \par \par Lexascan Myoview stress test November 2017, LDL 58%, normal perfusion, nonischemic EKG response, no anginal symptoms, baseline sinus rhythm PRWP, ASWMI unchanged, NSST\par  \par Lexiscan Myoview stress test April 2016, normal LVEF normal perfusion, nonischemic EKG Baseline sinus rhythm age undetermined anterolateral my, no anginal symptoms, 30\par \par 2-D echo March 2016, LVEF 60%, diastolic dysfunction, mild MR TR.\par \par Carotid and abdominal ultrasound March 2016, normal aortic size nonobstructive findings.\par \par Lexiscan Myoview stress test Feb 2015, normal LVEF normal wall motion, small inferoapical attenuation artifact, baseline NSR, PRWP\par

## 2023-02-15 NOTE — REASON FOR VISIT
[Follow-Up - Clinic] : a clinic follow-up of [Medication Management] : Medication management [Peripheral Vascular Disease] : peripheral vascular disease

## 2023-02-15 NOTE — DISCUSSION/SUMMARY
[FreeTextEntry1] : \par I educated the patient face to face on PAD regarding complications, signs and symptoms and treatments plans\par \par # Lower extremity pain with mild PAD: his symptoms appear neuropathic in nature involving soles of feet. his melvin is preserved and normal triphasic waveforms bilaterally. no other cardiovascular symptoms. 2+ distal pulses on exam.  Duplex imaging without obstructive disease. \par - I recommend neurologic evaluation. \par - Ordered cardiac rehab [facility] for supervised exercise therapy.\par \par # AAA: stable sizes last checked 8/2022. statin/aspirin on board. bp optimization. needs yearly due 8/2023. recently increased statin. serial labwork optimized. \par \par # Nonobstructive coronary artery disease: as above. \par \par # MARC: he reports pending cpap initation\par \par # Alzheimer's dementia\par \par \par Follow as needed.  ER precautions given to patient.\par

## 2023-02-21 ENCOUNTER — NON-APPOINTMENT (OUTPATIENT)
Age: 73
End: 2023-02-21

## 2023-03-08 ENCOUNTER — NON-APPOINTMENT (OUTPATIENT)
Age: 73
End: 2023-03-08

## 2023-03-30 ENCOUNTER — APPOINTMENT (OUTPATIENT)
Dept: CARDIOLOGY | Facility: CLINIC | Age: 73
End: 2023-03-30
Payer: MEDICARE

## 2023-03-30 VITALS
BODY MASS INDEX: 31.39 KG/M2 | HEIGHT: 67 IN | WEIGHT: 200 LBS | DIASTOLIC BLOOD PRESSURE: 64 MMHG | OXYGEN SATURATION: 95 % | HEART RATE: 85 BPM | SYSTOLIC BLOOD PRESSURE: 128 MMHG

## 2023-03-30 PROCEDURE — 99215 OFFICE O/P EST HI 40 MIN: CPT

## 2023-03-30 NOTE — ASSESSMENT
[FreeTextEntry1] : Korey is a 72-year-old male with medical history detailed above and active medical issues including: \par  \par -Atypical chest pain, abnormal Myoview stress test, abnormal baseline EKG, multiple CAD risk factors.  Nonobstructive coronary CTA with moderate risk coronary calcium score 293  April 2021.  Patient will return to cardiac rehab maximum heart rate 75% MPHR. \par \par - Sinus bradycardia, Toprol reduced to 50 Mg daily, Zio patch 1 week heart monitor Sept 2022 sinus rhythm, brief SVT no AF. \par \par - Hypertension.  Average resting home BPs at guideline goal on Toprol 50 Mg daily\par \par - Hyperlipidemia on atorvastatin well-tolerated, follow-up labs with PCP \par \par - Arthritis, knee pain and shoulder pain, limited exercise capacity.\par \par - Left leg claudication,  SHIV ordered\par \par - Patient followed by neurology for Alzheimer's dementia\par \par - Family history of premature coronary artery disease.\par \par Advised patient to follow active lifestyle with regular cardiovascular exercise. Patient educated on lifestyle and diet modification with low sodium low fat diet and avoidance of excessive alcohol. Patient is aware to call with any symptoms or concerns. \par \par Patient will be seen in cardiology follow-up 6 months same-day echocardiogram.  Current cardiac medications remain unchanged. Repeat labs will be ordered with PMD.\par \par Korey will followup with Dr Mack Mancera for primary care\par

## 2023-03-30 NOTE — REASON FOR VISIT
[FreeTextEntry1] : Korey is a 72-year-old male with a history of borderline hypertension, borderline dyslipidemia, Alzheimer's dementia, arthritis, knee pain and shoulder pain, and palpitations, GERD, atypical chest pain.\par \par Patient admitted [facility] 9/1/2022 atypical chest pain normal evaluation discharged for cardiology follow-up. No further chest pain. Patient states he had bradycardia while sleeping and Lopressor reduced to 50 mg daily. \par \par Cardiovascular review of symptoms is negative for exertional chest pain, dyspnea, palpitations, dizziness or syncope.  No PND or orthopnea leg edema.  No bleeding or black stool.\par \par No exercise routine.  Patient has knee pain with limited walking 15 minutes.  Patient has neuropathic foot pain normal SHIV vascular evaluation, seen by Dr. Montiel.\par \par Patient had recent elevation in blood pressure started on Toprol 50 mg daily subsequently increased to 100 mg daily with average resting home blood pressure at guideline goal. \par \par Zio patch 1 week heart monitor Sept 2022 sinus rhythm average heart rate 76, brief SVT, no AF\par \par Labs Feb 2022 normal BMP, CBC, LFT, TSH, HbA1c 6.0, total cholesterol 135, LDL 75, HDL 36, triglyceride 121 \par \par Coronary CTA  April 2021, nonobstructive coronaries, LVEF 55%, coronary calcium score 293, \par \par Lexiscan Myoview stress test March 2021, LVEF 55%, small anterior apical ischemic defect, nonischemic EKG response, no chest pain, baseline sinus rhythm, PRWP, NSST\par \par Echocardiogram March 2021 LVEF 60% mild to moderate MR and AI, mild TR, mild pulmonary hypertension, asending aorta 4.2 cm prior 3.4 cm. \par \par Lexascan Myoview stress test November 2019, LVEF 61%, normal perfusion, diaphragm attenuation seen, no chest pain, no ischemic EKG response, baseline sinus rhythm PRWP NSST\par \par Echocardiogram March 2019, LVEF 65%, mild concentric LVH, mild diastolic dysfunction, mild MR, AI, TR, normal RVSP\par \par SHIV March 2019 normal bilateral study, noncompressible right calf. \par \par Lexascan Myoview stress test November 2017, LDL 58%, normal perfusion, nonischemic EKG response, no anginal symptoms, baseline sinus rhythm PRWP, ASWMI unchanged, NSST\par  \par Lexiscan Myoview stress test April 2016, normal LVEF normal perfusion, nonischemic EKG Baseline sinus rhythm age undetermined anterolateral my, no anginal symptoms, 30\par \par 2-D echo March 2016, LVEF 60%, diastolic dysfunction, mild MR TR.\par \par Carotid and abdominal ultrasound March 2016, normal aortic size nonobstructive findings.\par \par Lexiscan Myoview stress test Feb 2015, normal LVEF normal wall motion, small inferoapical attenuation artifact, baseline NSR, PRWP\par \par 2DEcho Feb 2015, LVEF 60%, mild MR and TR\par \par Carotid and abdominal ultrasound nonobstructive, mid abdominal aorta 3.0cm (prior 1.6cm in 2012)\par \par EKG is sinus rhythm, age-indeterminate septal MI, inferolateral ST-T wave abnormality.\par  [FreeTextEntry2] : abnormal Myovies stress test, atypical chest pain, borderline HTN, borderline dyslipidemia

## 2023-08-07 ENCOUNTER — APPOINTMENT (OUTPATIENT)
Dept: CARDIOLOGY | Facility: CLINIC | Age: 73
End: 2023-08-07
Payer: MEDICARE

## 2023-08-07 VITALS
BODY MASS INDEX: 27.62 KG/M2 | HEART RATE: 69 BPM | OXYGEN SATURATION: 97 % | HEIGHT: 67 IN | WEIGHT: 176 LBS | DIASTOLIC BLOOD PRESSURE: 68 MMHG | SYSTOLIC BLOOD PRESSURE: 124 MMHG

## 2023-08-07 DIAGNOSIS — R00.1 BRADYCARDIA, UNSPECIFIED: ICD-10-CM

## 2023-08-07 PROCEDURE — 99214 OFFICE O/P EST MOD 30 MIN: CPT

## 2023-08-07 NOTE — DISCUSSION/SUMMARY
[FreeTextEntry1] :  # Lower extremity pain with mild PAD: his symptoms appear neuropathic in nature involving soles of feet. his melvin is preserved and normal triphasic waveforms bilaterally. no other cardiovascular symptoms. 2+ distal pulses on exam.  Duplex imaging without obstructive disease.  - I recommend neurologic evaluation.  - participating cardiac rehab [facility] for supervised exercise therapy.  # AAA: stable sizes last checked 8/2022. statin/aspirin on board. bp optimization. needs yearly due 8/2023. prior increased statin. serial labwork optimized.   # Nonobstructive coronary artery disease: as above.   # MARC: he reports not following up with this after diagnosis for cpap initiation. says he lost weight and wants to discuss more at primary cardiology visit.   # ?Alzheimer's dementia  Following with Dr. Valentino for routine cv care. Follow as needed.  ER precautions given to patient.

## 2023-08-07 NOTE — HISTORY OF PRESENT ILLNESS
[FreeTextEntry1] : 71 yo M Lower extremity pain nonvascular, appears neuropathic, mild PAD.  AAA  Nonobstructive coronary artery disease MARC  ? Alzheimer's dementia Reflux  8/2023 visit: seeing Dr. Valentino. still participating in cardiac rehab/exercise therapy. seeing podiatry for foot hygiene.   2/2023 VISIT: Testing with preserved vascular supply to lower extremities with normal SHIV and mild abnormalities on duplex. still has soles of feet pain.   1/2023 initial vascular visit: his symptoms appear neuropathic in nature involving soles of feet. limited by knee pain and hip pain. his shiv 1.5 years ago had triphasic waveforms bilaterally. no other cardiovascular symptoms. 2+ distal pulses on exam.    TESTING I REVIEWED TODAY:  2/2023: LABWORK: cmp, lipid,ck DUPLEX LOWER EXTREMITES: MILD ATHERO SHIV: preserved bilaterally  8/2022 3.3 cm AAA. no sig carotid disease. TTE reviewed asc ao 4 cm. .  SHIV 2021 normal waveforms.  Zio patch 1 week heart monitor Sept 2022 sinus rhythm average heart rate 76, brief SVT, no AF  Labs Feb 2022 normal BMP, CBC, LFT, TSH, HbA1c 6.0, total cholesterol 135, LDL 75, HDL 36, triglyceride 121   Coronary CTA April 2021, nonobstructive coronaries, LVEF 55%, coronary calcium score 293,   Lexiscan Myoview stress test March 2021, LVEF 55%, small anterior apical ischemic defect, nonischemic EKG response, no chest pain, baseline sinus rhythm, PRWP, NSST  Echocardiogram March 2021 LVEF 60% mild to moderate MR and AI, mild TR, mild pulmonary hypertension, asending aorta 4.2 cm prior 3.4 cm.   Lexascan Myoview stress test November 2019, LVEF 61%, normal perfusion, diaphragm attenuation seen, no chest pain, no ischemic EKG response, baseline sinus rhythm PRWP NSST  Echocardiogram March 2019, LVEF 65%, mild concentric LVH, mild diastolic dysfunction, mild MR, AI, TR, normal RVSP  SHIV March 2019 normal bilateral study, noncompressible right calf.   Lexascan Myoview stress test November 2017, LDL 58%, normal perfusion, nonischemic EKG response, no anginal symptoms, baseline sinus rhythm PRWP, ASWMI unchanged, NSST   Lexiscan Myoview stress test April 2016, normal LVEF normal perfusion, nonischemic EKG Baseline sinus rhythm age undetermined anterolateral my, no anginal symptoms, 30  2-D echo March 2016, LVEF 60%, diastolic dysfunction, mild MR TR.  Carotid and abdominal ultrasound March 2016, normal aortic size nonobstructive findings.  Lexiscan Myoview stress test Feb 2015, normal LVEF normal wall motion, small inferoapical attenuation artifact, baseline NSR, PRWP

## 2023-08-09 ENCOUNTER — RX RENEWAL (OUTPATIENT)
Age: 73
End: 2023-08-09

## 2023-08-24 ENCOUNTER — NON-APPOINTMENT (OUTPATIENT)
Age: 73
End: 2023-08-24

## 2023-10-06 ENCOUNTER — APPOINTMENT (OUTPATIENT)
Dept: CARDIOLOGY | Facility: CLINIC | Age: 73
End: 2023-10-06
Payer: MEDICARE

## 2023-10-06 PROCEDURE — 93979 VASCULAR STUDY: CPT

## 2023-10-06 PROCEDURE — 93306 TTE W/DOPPLER COMPLETE: CPT

## 2023-10-07 ENCOUNTER — NON-APPOINTMENT (OUTPATIENT)
Age: 73
End: 2023-10-07

## 2023-10-12 ENCOUNTER — APPOINTMENT (OUTPATIENT)
Dept: CARDIOLOGY | Facility: CLINIC | Age: 73
End: 2023-10-12
Payer: MEDICARE

## 2023-10-12 VITALS
BODY MASS INDEX: 28.88 KG/M2 | WEIGHT: 184 LBS | DIASTOLIC BLOOD PRESSURE: 60 MMHG | HEIGHT: 67 IN | HEART RATE: 62 BPM | OXYGEN SATURATION: 96 % | SYSTOLIC BLOOD PRESSURE: 110 MMHG

## 2023-10-12 LAB — HBA1C MFR BLD HPLC: 6.1

## 2023-10-12 PROCEDURE — 99215 OFFICE O/P EST HI 40 MIN: CPT

## 2023-10-12 PROCEDURE — 93000 ELECTROCARDIOGRAM COMPLETE: CPT

## 2023-10-12 RX ORDER — PSYLLIUM HUSK 0.4 G
CAPSULE ORAL DAILY
Refills: 0 | Status: ACTIVE | COMMUNITY

## 2023-10-12 RX ORDER — ATORVASTATIN CALCIUM 20 MG/1
20 TABLET, FILM COATED ORAL
Qty: 90 | Refills: 3 | Status: ACTIVE | COMMUNITY
Start: 2021-03-08 | End: 1900-01-01

## 2024-02-29 ENCOUNTER — NON-APPOINTMENT (OUTPATIENT)
Age: 74
End: 2024-02-29

## 2024-03-10 DIAGNOSIS — R94.39 ABNORMAL RESULT OF OTHER CARDIOVASCULAR FUNCTION STUDY: ICD-10-CM

## 2024-03-27 ENCOUNTER — APPOINTMENT (OUTPATIENT)
Dept: CARDIOLOGY | Facility: CLINIC | Age: 74
End: 2024-03-27
Payer: MEDICARE

## 2024-03-27 PROCEDURE — 93923 UPR/LXTR ART STDY 3+ LVLS: CPT

## 2024-04-10 ENCOUNTER — APPOINTMENT (OUTPATIENT)
Dept: CARDIOLOGY | Facility: CLINIC | Age: 74
End: 2024-04-10
Payer: MEDICARE

## 2024-04-10 VITALS
WEIGHT: 185 LBS | DIASTOLIC BLOOD PRESSURE: 70 MMHG | HEIGHT: 67 IN | HEART RATE: 101 BPM | OXYGEN SATURATION: 94 % | BODY MASS INDEX: 29.03 KG/M2 | SYSTOLIC BLOOD PRESSURE: 130 MMHG

## 2024-04-10 PROCEDURE — G2211 COMPLEX E/M VISIT ADD ON: CPT

## 2024-04-10 PROCEDURE — 99215 OFFICE O/P EST HI 40 MIN: CPT

## 2024-04-10 RX ORDER — PSYLLIUM HUSK 0.4 G
CAPSULE ORAL
Refills: 0 | Status: DISCONTINUED | COMMUNITY
End: 2024-04-10

## 2024-04-10 NOTE — PHYSICAL EXAM
[Well Nourished] : well nourished [Well Developed] : well developed [No Acute Distress] : no acute distress [No Carotid Bruit] : no carotid bruit [Normal Venous Pressure] : normal venous pressure [Normal S1, S2] : normal S1, S2 [No Murmur] : no murmur [No Rub] : no rub [No Gallop] : no gallop [Clear Lung Fields] : clear lung fields [Good Air Entry] : good air entry [No Respiratory Distress] : no respiratory distress  [Soft] : abdomen soft [Non Tender] : non-tender [No Masses/organomegaly] : no masses/organomegaly [Normal Bowel Sounds] : normal bowel sounds [Normal Gait] : normal gait [No Edema] : no edema [No Cyanosis] : no cyanosis [No Clubbing] : no clubbing [No Varicosities] : no varicosities [No Rash] : no rash [No Skin Lesions] : no skin lesions [Moves all extremities] : moves all extremities [No Focal Deficits] : no focal deficits [Normal Speech] : normal speech [Alert and Oriented] : alert and oriented [Normal Appearance] : normal appearance [General Appearance - Well Developed] : well developed [Well Groomed] : well groomed [General Appearance - Well Nourished] : well nourished [No Deformities] : no deformities [General Appearance - In No Acute Distress] : no acute distress [Normal Conjunctiva] : the conjunctiva exhibited no abnormalities [Eyelids - No Xanthelasma] : the eyelids demonstrated no xanthelasmas [No Oral Pallor] : no oral pallor [Normal Oral Mucosa] : normal oral mucosa [No Oral Cyanosis] : no oral cyanosis [Normal Jugular Venous A Waves Present] : normal jugular venous A waves present [Normal Jugular Venous V Waves Present] : normal jugular venous V waves present [No Jugular Venous Omalley A Waves] : no jugular venous omalley A waves [Respiration, Rhythm And Depth] : normal respiratory rhythm and effort [Exaggerated Use Of Accessory Muscles For Inspiration] : no accessory muscle use [Auscultation Breath Sounds / Voice Sounds] : lungs were clear to auscultation bilaterally [Heart Rate And Rhythm] : heart rate and rhythm were normal [Murmurs] : no murmurs present [Heart Sounds] : normal S1 and S2 [Abdomen Soft] : soft [Abdomen Tenderness] : non-tender [Abdomen Mass (___ Cm)] : no abdominal mass palpated [Gait - Sufficient For Exercise Testing] : the gait was sufficient for exercise testing [Abnormal Walk] : normal gait [Nail Clubbing] : no clubbing of the fingernails [Cyanosis, Localized] : no localized cyanosis [Petechial Hemorrhages (___cm)] : no petechial hemorrhages [Skin Color & Pigmentation] : normal skin color and pigmentation [] : no rash [No Venous Stasis] : no venous stasis [Skin Lesions] : no skin lesions [No Skin Ulcers] : no skin ulcer [No Xanthoma] : no  xanthoma was observed [Oriented To Time, Place, And Person] : oriented to person, place, and time [Affect] : the affect was normal [No Anxiety] : not feeling anxious [Mood] : the mood was normal [de-identified] : Mild memory loss

## 2024-04-10 NOTE — REASON FOR VISIT
[Other: ____] : [unfilled] [Follow-Up - Clinic] : a clinic follow-up of [FreeTextEntry1] : Korey is a 73-year-old male with a history of borderline hypertension, borderline dyslipidemia, Alzheimer's dementia, arthritis, knee pain and shoulder pain, and palpitations, GERD, atypical chest pain.  Cardiovascular review of symptoms is negative for exertional chest pain, dyspnea, palpitations, dizziness or syncope.  No PND or orthopnea leg edema.  No bleeding or black stool.  No exercise routine.  Patient has knee pain with limited walking 15 minutes.  Patient has neuropathic foot pain normal [facility] vascular evaluation, seen by Dr. Montiel.  Patient admitted [facility] 9/1/2022 atypical chest pain normal evaluation discharged for cardiology follow-up. No further chest pain. Patient states he had bradycardia while sleeping and Lopressor reduced to 50 mg daily.  Current dose of Toprol 50 Mg twice daily, BP at guideline goal average heart rate 60s.   Patient had recent elevation in blood pressure started on Toprol 50 mg daily subsequently increased to 100 mg daily with average resting home blood pressure at guideline goal.   Echocardiogram Oct 2023 LVEF 57%, moderate MR, mild-moderate AI, ascending aorta 4.4 cm  Zio patch 1 week heart monitor Sept 2022 sinus rhythm average heart rate 76, brief SVT, no AF  Labs Feb 2022 normal BMP, CBC, LFT, TSH, HbA1c 6.0, total cholesterol 135, LDL 75, HDL 36, triglyceride 121   Coronary CTA  April 2021, nonobstructive coronaries, LVEF 55%, coronary calcium score 293,   Lexiscan Myoview stress test March 2021, LVEF 55%, small anterior apical ischemic defect, nonischemic EKG response, no chest pain, baseline sinus rhythm, PRWP, NSST  Echocardiogram March 2021 LVEF 60% mild to moderate MR and AI, mild TR, mild pulmonary hypertension, asending aorta 4.2 cm prior 3.4 cm.   Lexascan Myoview stress test November 2019, LVEF 61%, normal perfusion, diaphragm attenuation seen, no chest pain, no ischemic EKG response, baseline sinus rhythm PRWP NSST  Echocardiogram March 2019, LVEF 65%, mild concentric LVH, mild diastolic dysfunction, mild MR, AI, TR, normal RVSP  SHIV March 2019 normal bilateral study, noncompressible right calf.   Lexascan Myoview stress test November 2017, LDL 58%, normal perfusion, nonischemic EKG response, no anginal symptoms, baseline sinus rhythm PRWP, ASWMI unchanged, NSST   Lexiscan Myoview stress test April 2016, normal LVEF normal perfusion, nonischemic EKG Baseline sinus rhythm age undetermined anterolateral my, no anginal symptoms, 30  2-D echo March 2016, LVEF 60%, diastolic dysfunction, mild MR TR.  Carotid and abdominal ultrasound March 2016, normal aortic size nonobstructive findings.  Lexiscan Myoview stress test Feb 2015, normal LVEF normal wall motion, small inferoapical attenuation artifact, baseline NSR, PRWP  2DEcho Feb 2015, LVEF 60%, mild MR and TR  Carotid and abdominal ultrasound nonobstructive, mid abdominal aorta 3.0cm (prior 1.6cm in 2012)  EKG is sinus rhythm, age-indeterminate septal MI, inferolateral ST-T wave abnormality.  [FreeTextEntry2] : abnormal Myovies stress test, atypical chest pain, borderline HTN, borderline dyslipidemia

## 2024-04-10 NOTE — ASSESSMENT
[FreeTextEntry1] : Korey is a 73-year-old male with medical history detailed above and active medical issues including:    - Atypical chest pain, abnormal Myoview stress test, abnormal baseline EKG, multiple CAD risk factors.  Nonobstructive coronary CTA with moderate risk coronary calcium score 293  April 2021.  Patient will continue cardiac rehab maximum heart rate 75% MPHR.   - Sinus bradycardia, Toprol reduced to 50 Mg daily, Zio patch 1 week heart monitor Sept 2022 sinus rhythm, brief SVT no AF. Current dose of Toprol 50 Mg twice daily, BP at guideline goal average heart rate 60s.   - Hypertension.  Average resting home BPs at guideline goal on Toprol 50 Mg daily  - Hyperlipidemia on atorvastatin well-tolerated, follow-up labs with PCP   - Arthritis, knee pain and shoulder pain, limited exercise capacity.  - Left leg claudication,  SHIV ordered  - Patient followed by neurology , patient states he does not have Alzheimer's dementia  - Family history of premature coronary artery disease.  Advised patient to follow active lifestyle with regular cardiovascular exercise. Patient educated on lifestyle and diet modification with low sodium low fat diet and avoidance of excessive alcohol. Patient is aware to call with any symptoms or concerns.   Patient will be seen in cardiology follow-up 6 months same-day echocardiogram.  Current cardiac medications remain unchanged. Repeat labs will be ordered with PMD.  Korey will followup with Dr Mack Mancera for primary care  Total time spent 45 minutes, reviewing of test results, chart information, patient discussion, physical exam and completion of chart documentation.

## 2024-04-12 ENCOUNTER — RX RENEWAL (OUTPATIENT)
Age: 74
End: 2024-04-12

## 2024-04-15 ENCOUNTER — APPOINTMENT (OUTPATIENT)
Dept: CARDIOLOGY | Facility: CLINIC | Age: 74
End: 2024-04-15
Payer: MEDICARE

## 2024-04-15 VITALS
SYSTOLIC BLOOD PRESSURE: 100 MMHG | HEART RATE: 74 BPM | DIASTOLIC BLOOD PRESSURE: 50 MMHG | OXYGEN SATURATION: 95 % | WEIGHT: 185 LBS | BODY MASS INDEX: 29.03 KG/M2 | HEIGHT: 67 IN

## 2024-04-15 DIAGNOSIS — R26.9 UNSPECIFIED ABNORMALITIES OF GAIT AND MOBILITY: ICD-10-CM

## 2024-04-15 DIAGNOSIS — I73.9 PERIPHERAL VASCULAR DISEASE, UNSPECIFIED: ICD-10-CM

## 2024-04-15 DIAGNOSIS — Z87.898 PERSONAL HISTORY OF OTHER SPECIFIED CONDITIONS: ICD-10-CM

## 2024-04-15 PROCEDURE — 99215 OFFICE O/P EST HI 40 MIN: CPT

## 2024-04-15 PROCEDURE — G2211 COMPLEX E/M VISIT ADD ON: CPT

## 2024-04-15 RX ORDER — METOPROLOL SUCCINATE 50 MG/1
50 TABLET, EXTENDED RELEASE ORAL
Qty: 180 | Refills: 1 | Status: DISCONTINUED | COMMUNITY
Start: 2023-02-11 | End: 2024-04-15

## 2024-04-15 RX ORDER — METOPROLOL SUCCINATE 50 MG/1
50 TABLET, EXTENDED RELEASE ORAL
Qty: 90 | Refills: 3 | Status: ACTIVE | COMMUNITY
Start: 2022-07-19 | End: 1900-01-01

## 2024-04-15 RX ORDER — LOSARTAN POTASSIUM 25 MG/1
25 TABLET, FILM COATED ORAL DAILY
Qty: 90 | Refills: 3 | Status: ACTIVE | COMMUNITY
Start: 1900-01-01 | End: 1900-01-01

## 2024-04-15 NOTE — PHYSICAL EXAM
[Well Developed] : well developed [Well Nourished] : well nourished [No Acute Distress] : no acute distress [Normal Venous Pressure] : normal venous pressure [No Carotid Bruit] : no carotid bruit [Normal S1, S2] : normal S1, S2 [No Murmur] : no murmur [No Rub] : no rub [No Gallop] : no gallop [Clear Lung Fields] : clear lung fields [Good Air Entry] : good air entry [No Respiratory Distress] : no respiratory distress  [Soft] : abdomen soft [Non Tender] : non-tender [No Masses/organomegaly] : no masses/organomegaly [Normal Bowel Sounds] : normal bowel sounds [Normal Gait] : normal gait [No Edema] : no edema [No Cyanosis] : no cyanosis [No Clubbing] : no clubbing [No Varicosities] : no varicosities [No Rash] : no rash [No Skin Lesions] : no skin lesions [Moves all extremities] : moves all extremities [Normal Speech] : normal speech [No Focal Deficits] : no focal deficits [Alert and Oriented] : alert and oriented [General Appearance - Well Developed] : well developed [Normal Appearance] : normal appearance [Well Groomed] : well groomed [General Appearance - Well Nourished] : well nourished [No Deformities] : no deformities [General Appearance - In No Acute Distress] : no acute distress [Normal Conjunctiva] : the conjunctiva exhibited no abnormalities [Eyelids - No Xanthelasma] : the eyelids demonstrated no xanthelasmas [Normal Oral Mucosa] : normal oral mucosa [No Oral Pallor] : no oral pallor [No Oral Cyanosis] : no oral cyanosis [Normal Jugular Venous A Waves Present] : normal jugular venous A waves present [Normal Jugular Venous V Waves Present] : normal jugular venous V waves present [No Jugular Venous Omalley A Waves] : no jugular venous omalley A waves [Respiration, Rhythm And Depth] : normal respiratory rhythm and effort [Exaggerated Use Of Accessory Muscles For Inspiration] : no accessory muscle use [Auscultation Breath Sounds / Voice Sounds] : lungs were clear to auscultation bilaterally [Heart Rate And Rhythm] : heart rate and rhythm were normal [Heart Sounds] : normal S1 and S2 [Murmurs] : no murmurs present [Abdomen Soft] : soft [Abdomen Tenderness] : non-tender [Abdomen Mass (___ Cm)] : no abdominal mass palpated [Abnormal Walk] : normal gait [Gait - Sufficient For Exercise Testing] : the gait was sufficient for exercise testing [Nail Clubbing] : no clubbing of the fingernails [Cyanosis, Localized] : no localized cyanosis [Petechial Hemorrhages (___cm)] : no petechial hemorrhages [Skin Color & Pigmentation] : normal skin color and pigmentation [] : no rash [Skin Lesions] : no skin lesions [No Venous Stasis] : no venous stasis [No Skin Ulcers] : no skin ulcer [No Xanthoma] : no  xanthoma was observed [Oriented To Time, Place, And Person] : oriented to person, place, and time [Affect] : the affect was normal [Mood] : the mood was normal [No Anxiety] : not feeling anxious [de-identified] : Mild memory loss

## 2024-04-15 NOTE — REASON FOR VISIT
[Other: ____] : [unfilled] [Follow-Up - Clinic] : a clinic follow-up of [FreeTextEntry1] : Korey is a 73-year-old male with a history of borderline hypertension, borderline dyslipidemia, Alzheimer's dementia, arthritis, knee pain and shoulder pain, and palpitations, GERD, atypical chest pain.  Hospital follow-up [facility] 4/14/2024 atypical chest pain, nonischemic EKG, normal troponin discharged for outpatient cardiology ischemic evaluation.  Cardiovascular review of symptoms is negative for exertional chest pain, dyspnea, palpitations, dizziness or syncope.  No PND or orthopnea leg edema.  No bleeding or black stool.  No exercise routine.  Patient has knee pain with limited walking 15 minutes.  Patient has neuropathic foot pain normal [facility] vascular evaluation, seen by Dr. Montiel.  Patient admitted [facility] 9/1/2022 atypical chest pain normal evaluation discharged for cardiology follow-up. No further chest pain. Patient states he had bradycardia while sleeping and Lopressor reduced to 50 mg daily.  Current dose of Toprol 50 Mg twice daily, BP at guideline goal average heart rate 60s.   Patient had recent elevation in blood pressure started on Toprol 50 mg daily subsequently increased to 100 mg daily with average resting home blood pressure at guideline goal.   Echocardiogram Oct 2023 LVEF 57%, moderate MR, mild-moderate AI, ascending aorta 4.4 cm  Zio patch 1 week heart monitor Sept 2022 sinus rhythm average heart rate 76, brief SVT, no AF  Labs Feb 2022 normal BMP, CBC, LFT, TSH, HbA1c 6.0, total cholesterol 135, LDL 75, HDL 36, triglyceride 121   Coronary CTA  April 2021, nonobstructive coronaries, LVEF 55%, coronary calcium score 293,   Lexiscan Myoview stress test March 2021, LVEF 55%, small anterior apical ischemic defect, nonischemic EKG response, no chest pain, baseline sinus rhythm, PRWP, NSST  Echocardiogram March 2021 LVEF 60% mild to moderate MR and AI, mild TR, mild pulmonary hypertension, asending aorta 4.2 cm prior 3.4 cm.   Lexascan Myoview stress test November 2019, LVEF 61%, normal perfusion, diaphragm attenuation seen, no chest pain, no ischemic EKG response, baseline sinus rhythm PRWP NSST  Echocardiogram March 2019, LVEF 65%, mild concentric LVH, mild diastolic dysfunction, mild MR, AI, TR, normal RVSP  [facility] March 2019 normal bilateral study, noncompressible right calf.   Lexascan Myoview stress test November 2017, LDL 58%, normal perfusion, nonischemic EKG response, no anginal symptoms, baseline sinus rhythm PRWP, ASWMI unchanged, NSST   Lexiscan Myoview stress test April 2016, normal LVEF normal perfusion, nonischemic EKG Baseline sinus rhythm age undetermined anterolateral my, no anginal symptoms, 30  2-D echo March 2016, LVEF 60%, diastolic dysfunction, mild MR TR.  Carotid and abdominal ultrasound March 2016, normal aortic size nonobstructive findings.  Lexiscan Myoview stress test Feb 2015, normal LVEF normal wall motion, small inferoapical attenuation artifact, baseline NSR, PRWP  2DEcho Feb 2015, LVEF 60%, mild MR and TR  Carotid and abdominal ultrasound nonobstructive, mid abdominal aorta 3.0cm (prior 1.6cm in 2012)  EKG is sinus rhythm, age-indeterminate septal MI, inferolateral ST-T wave abnormality.  [FreeTextEntry2] : abnormal Myovies stress test, atypical chest pain, borderline HTN, borderline dyslipidemia

## 2024-04-15 NOTE — ASSESSMENT
[FreeTextEntry1] : Patient has medical history detailed above and active medical issues including:   - Hospital follow-up [facility] 4/14/2024 atypical chest pain, nonischemic EKG, normal troponin discharged for outpatient cardiology ischemic evaluation.  Patient will have noninvasive testing with a Lexiscan Myoview stress test to assess for obstructive CAD,  echocardiogram for LVEF, wall motion, structural heart disease,  carotid and abdominal ultrasound to assess for obstructive PAD.   - Sinus bradycardia, Toprol reduced to 50 Mg daily, Zio patch 1 week heart monitor Sept 2022 sinus rhythm, brief SVT no AF. Current dose of Toprol 50 Mg twice daily, BP at guideline goal average heart rate 60s.   - Hypertension.  Average resting home BPs at guideline goal on Toprol 50 Mg daily  - Hyperlipidemia on atorvastatin well-tolerated, follow-up labs with PCP   - Arthritis, knee pain and shoulder pain, limited exercise capacity.  - Left leg claudication,  SHIV ordered  - Patient followed by neurology , patient states he does not have Alzheimer's dementia  - Family history of premature coronary artery disease.  Advised patient to follow active lifestyle with regular cardiovascular exercise. Patient educated on lifestyle and diet modification with low sodium low fat diet and avoidance of excessive alcohol. Patient is aware to call with any symptoms or concerns.   Patient will be seen in cardiology follow-up after noninvasive testing.  Current cardiac medications remain unchanged. Repeat labs will be ordered with PMD.  Korey will followup with Dr Mack Mancera for primary care  Total time spent 45 minutes, reviewing of test results, chart information, patient discussion, physical exam and completion of chart documentation.

## 2024-04-23 ENCOUNTER — APPOINTMENT (OUTPATIENT)
Dept: CARDIOLOGY | Facility: CLINIC | Age: 74
End: 2024-04-23
Payer: MEDICARE

## 2024-04-23 PROCEDURE — 93015 CV STRESS TEST SUPVJ I&R: CPT

## 2024-04-23 PROCEDURE — A9502: CPT

## 2024-04-23 PROCEDURE — 78452 HT MUSCLE IMAGE SPECT MULT: CPT

## 2024-04-24 PROBLEM — E78.5 HYPERLIPIDEMIA: Status: ACTIVE | Noted: 2018-04-26

## 2024-04-24 PROBLEM — I77.810 ASCENDING AORTA DILATATION: Status: ACTIVE | Noted: 2021-03-25

## 2024-04-24 PROBLEM — R06.09 DOE (DYSPNEA ON EXERTION): Status: ACTIVE | Noted: 2019-10-25

## 2024-04-24 PROBLEM — R94.31 ABNORMAL EKG: Status: ACTIVE | Noted: 2019-10-25

## 2024-04-24 PROBLEM — I10 HYPERTENSION: Status: ACTIVE | Noted: 2018-04-26

## 2024-04-24 PROBLEM — R07.9 CENTRAL CHEST PAIN: Status: ACTIVE | Noted: 2024-04-15

## 2024-04-24 PROBLEM — G47.33 OSA ON CPAP: Status: ACTIVE | Noted: 2022-11-02

## 2024-05-01 ENCOUNTER — APPOINTMENT (OUTPATIENT)
Dept: CARDIOLOGY | Facility: CLINIC | Age: 74
End: 2024-05-01
Payer: MEDICARE

## 2024-05-01 VITALS
HEART RATE: 105 BPM | BODY MASS INDEX: 29.03 KG/M2 | SYSTOLIC BLOOD PRESSURE: 100 MMHG | WEIGHT: 185 LBS | OXYGEN SATURATION: 97 % | DIASTOLIC BLOOD PRESSURE: 66 MMHG | HEIGHT: 67 IN

## 2024-05-01 DIAGNOSIS — I10 ESSENTIAL (PRIMARY) HYPERTENSION: ICD-10-CM

## 2024-05-01 DIAGNOSIS — G47.33 OBSTRUCTIVE SLEEP APNEA (ADULT) (PEDIATRIC): ICD-10-CM

## 2024-05-01 DIAGNOSIS — R94.31 ABNORMAL ELECTROCARDIOGRAM [ECG] [EKG]: ICD-10-CM

## 2024-05-01 DIAGNOSIS — E78.5 HYPERLIPIDEMIA, UNSPECIFIED: ICD-10-CM

## 2024-05-01 DIAGNOSIS — I77.810 THORACIC AORTIC ECTASIA: ICD-10-CM

## 2024-05-01 DIAGNOSIS — R06.09 OTHER FORMS OF DYSPNEA: ICD-10-CM

## 2024-05-01 DIAGNOSIS — R07.9 CHEST PAIN, UNSPECIFIED: ICD-10-CM

## 2024-05-01 PROCEDURE — 99215 OFFICE O/P EST HI 40 MIN: CPT

## 2024-05-01 PROCEDURE — G2211 COMPLEX E/M VISIT ADD ON: CPT

## 2024-05-01 NOTE — ASSESSMENT
[FreeTextEntry1] : Patient has medical history detailed above and active medical issues including:   - Hospital follow-up [facility] 4/14/2024 atypical chest pain, nonischemic EKG, normal troponin discharged for outpatient cardiology ischemic evaluation.  Normal perfusion Myoview stress test with normal LVEF April 2024  - Sinus bradycardia, Toprol reduced to 50 Mg daily, Zio patch 1 week heart monitor Sept 2022 sinus rhythm, brief SVT no AF. Current dose of Toprol 50 Mg twice daily, BP at guideline goal average heart rate 60s.   - Hypertension.  Average resting home BPs at guideline goal on Toprol 50 Mg daily  - Hyperlipidemia on atorvastatin well-tolerated, follow-up labs with PCP   - Arthritis, knee pain and shoulder pain, limited exercise capacity.  - Left leg claudication, SHIV ordered  - Patient followed by neurology, patient states he does not have Alzheimer's dementia  - Family history of premature coronary artery disease.  Advised patient to follow active lifestyle with regular cardiovascular exercise. Patient educated on lifestyle and diet modification with low sodium low fat diet and avoidance of excessive alcohol. Patient is aware to call with any symptoms or concerns.   Patient will be seen in cardiology follow-up 6 months.  Current cardiac medications remain unchanged. Repeat labs will be ordered with PMDJonnathan Nair will followup with Dr Mack Mancera for primary care  Total time spent 45 minutes, reviewing of test results, chart information, patient discussion, physical exam and completion of chart documentation.

## 2024-05-01 NOTE — REASON FOR VISIT
[Other: ____] : [unfilled] [Follow-Up - Clinic] : a clinic follow-up of [FreeTextEntry1] : Korey is a 73-year-old male with a history of borderline hypertension, borderline dyslipidemia, Alzheimer's dementia, arthritis, knee pain and shoulder pain, and palpitations, GERD, atypical chest pain.  Hospital follow-up [facility] 4/14/2024 atypical chest pain, nonischemic EKG, normal troponin discharged for outpatient cardiology ischemic evaluation.  Cardiovascular review of symptoms is negative for exertional chest pain, dyspnea, palpitations, dizziness or syncope.  No PND or orthopnea leg edema.  No bleeding or black stool.  No exercise routine.  Patient has knee pain with limited walking 15 minutes.  Patient has neuropathic foot pain normal [facility] vascular evaluation, seen by Dr. Montiel.  Patient admitted [facility] 9/1/2022 atypical chest pain normal evaluation discharged for cardiology follow-up. No further chest pain. Patient states he had bradycardia while sleeping and Lopressor reduced to 50 mg daily.  Current dose of Toprol 50 Mg twice daily, BP at guideline goal average heart rate 60s.   Patient had recent elevation in blood pressure started on Toprol 50 mg daily subsequently increased to 100 mg daily with average resting home blood pressure at guideline goal.   Lexiscan Myoview stress test April 2024 LVEF 74% normal perfusion, inferior attenuation seen  Echocardiogram Oct 2023 LVEF 57%, moderate MR, mild-moderate AI, ascending aorta 4.4 cm  Zio patch 1 week heart monitor Sept 2022 sinus rhythm average heart rate 76, brief SVT, no AF  Labs Feb 2022 normal BMP, CBC, LFT, TSH, HbA1c 6.0, total cholesterol 135, LDL 75, HDL 36, triglyceride 121   Coronary CTA  April 2021, nonobstructive coronaries, LVEF 55%, coronary calcium score 293,   Lexiscan Myoview stress test March 2021, LVEF 55%, small anterior apical ischemic defect, nonischemic EKG response, no chest pain, baseline sinus rhythm, PRWP, NSST  Echocardiogram March 2021 LVEF 60% mild to moderate MR and AI, mild TR, mild pulmonary hypertension, asending aorta 4.2 cm prior 3.4 cm.   Lexascan Myoview stress test November 2019, LVEF 61%, normal perfusion, diaphragm attenuation seen, no chest pain, no ischemic EKG response, baseline sinus rhythm PRWP NSST  Echocardiogram March 2019, LVEF 65%, mild concentric LVH, mild diastolic dysfunction, mild MR, AI, TR, normal RVSP  [facility] March 2019 normal bilateral study, noncompressible right calf.   Lexascan Myoview stress test November 2017, LDL 58%, normal perfusion, nonischemic EKG response, no anginal symptoms, baseline sinus rhythm PRWP, ASWMI unchanged, NSST   Lexiscan Myoview stress test April 2016, normal LVEF normal perfusion, nonischemic EKG Baseline sinus rhythm age undetermined anterolateral my, no anginal symptoms, 30  2-D echo March 2016, LVEF 60%, diastolic dysfunction, mild MR TR.  Carotid and abdominal ultrasound March 2016, normal aortic size nonobstructive findings.  Lexiscan Myoview stress test Feb 2015, normal LVEF normal wall motion, small inferoapical attenuation artifact, baseline NSR, PRWP  2DEcho Feb 2015, LVEF 60%, mild MR and TR  Carotid and abdominal ultrasound nonobstructive, mid abdominal aorta 3.0cm (prior 1.6cm in 2012)  EKG is sinus rhythm, age-indeterminate septal MI, inferolateral ST-T wave abnormality.  [FreeTextEntry2] : abnormal Myovies stress test, atypical chest pain, borderline HTN, borderline dyslipidemia

## 2024-05-01 NOTE — PHYSICAL EXAM
[Well Developed] : well developed [Well Nourished] : well nourished [No Acute Distress] : no acute distress [Normal Venous Pressure] : normal venous pressure [No Carotid Bruit] : no carotid bruit [Normal S1, S2] : normal S1, S2 [No Murmur] : no murmur [No Rub] : no rub [No Gallop] : no gallop [Clear Lung Fields] : clear lung fields [Good Air Entry] : good air entry [No Respiratory Distress] : no respiratory distress  [Soft] : abdomen soft [Non Tender] : non-tender [No Masses/organomegaly] : no masses/organomegaly [Normal Bowel Sounds] : normal bowel sounds [Normal Gait] : normal gait [No Edema] : no edema [No Cyanosis] : no cyanosis [No Clubbing] : no clubbing [No Varicosities] : no varicosities [No Rash] : no rash [No Skin Lesions] : no skin lesions [Moves all extremities] : moves all extremities [No Focal Deficits] : no focal deficits [Normal Speech] : normal speech [Alert and Oriented] : alert and oriented [General Appearance - Well Developed] : well developed [Normal Appearance] : normal appearance [Well Groomed] : well groomed [General Appearance - Well Nourished] : well nourished [No Deformities] : no deformities [General Appearance - In No Acute Distress] : no acute distress [Normal Conjunctiva] : the conjunctiva exhibited no abnormalities [Eyelids - No Xanthelasma] : the eyelids demonstrated no xanthelasmas [Normal Oral Mucosa] : normal oral mucosa [No Oral Pallor] : no oral pallor [No Oral Cyanosis] : no oral cyanosis [Normal Jugular Venous A Waves Present] : normal jugular venous A waves present [Normal Jugular Venous V Waves Present] : normal jugular venous V waves present [No Jugular Venous Omalley A Waves] : no jugular venous omalley A waves [Respiration, Rhythm And Depth] : normal respiratory rhythm and effort [Exaggerated Use Of Accessory Muscles For Inspiration] : no accessory muscle use [Auscultation Breath Sounds / Voice Sounds] : lungs were clear to auscultation bilaterally [Heart Rate And Rhythm] : heart rate and rhythm were normal [Heart Sounds] : normal S1 and S2 [Murmurs] : no murmurs present [Abdomen Soft] : soft [Abdomen Tenderness] : non-tender [Abdomen Mass (___ Cm)] : no abdominal mass palpated [Abnormal Walk] : normal gait [Gait - Sufficient For Exercise Testing] : the gait was sufficient for exercise testing [Nail Clubbing] : no clubbing of the fingernails [Cyanosis, Localized] : no localized cyanosis [Petechial Hemorrhages (___cm)] : no petechial hemorrhages [Skin Color & Pigmentation] : normal skin color and pigmentation [] : no rash [No Venous Stasis] : no venous stasis [Skin Lesions] : no skin lesions [No Skin Ulcers] : no skin ulcer [No Xanthoma] : no  xanthoma was observed [Oriented To Time, Place, And Person] : oriented to person, place, and time [Affect] : the affect was normal [Mood] : the mood was normal [No Anxiety] : not feeling anxious [de-identified] : Mild memory loss

## 2024-05-23 ENCOUNTER — NON-APPOINTMENT (OUTPATIENT)
Age: 74
End: 2024-05-23

## 2024-10-08 LAB — HBA1C MFR BLD HPLC: 5.2

## 2024-10-17 ENCOUNTER — APPOINTMENT (OUTPATIENT)
Dept: CARDIOLOGY | Facility: CLINIC | Age: 74
End: 2024-10-17
Payer: MEDICARE

## 2024-10-17 ENCOUNTER — NON-APPOINTMENT (OUTPATIENT)
Age: 74
End: 2024-10-17

## 2024-10-17 VITALS
HEART RATE: 68 BPM | HEIGHT: 67 IN | WEIGHT: 180 LBS | SYSTOLIC BLOOD PRESSURE: 114 MMHG | BODY MASS INDEX: 28.25 KG/M2 | OXYGEN SATURATION: 96 % | DIASTOLIC BLOOD PRESSURE: 60 MMHG

## 2024-10-17 DIAGNOSIS — I73.9 PERIPHERAL VASCULAR DISEASE, UNSPECIFIED: ICD-10-CM

## 2024-10-17 DIAGNOSIS — I10 ESSENTIAL (PRIMARY) HYPERTENSION: ICD-10-CM

## 2024-10-17 DIAGNOSIS — R26.9 UNSPECIFIED ABNORMALITIES OF GAIT AND MOBILITY: ICD-10-CM

## 2024-10-17 DIAGNOSIS — G47.33 OBSTRUCTIVE SLEEP APNEA (ADULT) (PEDIATRIC): ICD-10-CM

## 2024-10-17 DIAGNOSIS — R94.31 ABNORMAL ELECTROCARDIOGRAM [ECG] [EKG]: ICD-10-CM

## 2024-10-17 DIAGNOSIS — R00.1 BRADYCARDIA, UNSPECIFIED: ICD-10-CM

## 2024-10-17 DIAGNOSIS — R07.9 CHEST PAIN, UNSPECIFIED: ICD-10-CM

## 2024-10-17 DIAGNOSIS — R06.09 OTHER FORMS OF DYSPNEA: ICD-10-CM

## 2024-10-17 DIAGNOSIS — Z87.898 PERSONAL HISTORY OF OTHER SPECIFIED CONDITIONS: ICD-10-CM

## 2024-10-17 DIAGNOSIS — E78.5 HYPERLIPIDEMIA, UNSPECIFIED: ICD-10-CM

## 2024-10-17 PROCEDURE — 99215 OFFICE O/P EST HI 40 MIN: CPT

## 2024-10-17 PROCEDURE — G2211 COMPLEX E/M VISIT ADD ON: CPT

## 2024-10-24 ENCOUNTER — APPOINTMENT (OUTPATIENT)
Dept: CARDIOLOGY | Facility: CLINIC | Age: 74
End: 2024-10-24

## 2024-10-26 ENCOUNTER — RX RENEWAL (OUTPATIENT)
Age: 74
End: 2024-10-26

## 2025-01-24 ENCOUNTER — NON-APPOINTMENT (OUTPATIENT)
Age: 75
End: 2025-01-24

## 2025-01-24 ENCOUNTER — APPOINTMENT (OUTPATIENT)
Dept: CARDIOLOGY | Facility: CLINIC | Age: 75
End: 2025-01-24
Payer: MEDICARE

## 2025-01-24 VITALS
HEIGHT: 67 IN | SYSTOLIC BLOOD PRESSURE: 122 MMHG | DIASTOLIC BLOOD PRESSURE: 60 MMHG | WEIGHT: 185 LBS | OXYGEN SATURATION: 93 % | BODY MASS INDEX: 29.03 KG/M2 | HEART RATE: 66 BPM

## 2025-01-24 PROCEDURE — G2211 COMPLEX E/M VISIT ADD ON: CPT

## 2025-01-24 PROCEDURE — 99213 OFFICE O/P EST LOW 20 MIN: CPT

## 2025-01-24 RX ORDER — CHOLECALCIFEROL (VITAMIN D3) 25 MCG
TABLET ORAL
Refills: 0 | Status: ACTIVE | COMMUNITY

## 2025-01-24 RX ORDER — METOPROLOL SUCCINATE 50 MG/1
50 TABLET, EXTENDED RELEASE ORAL DAILY
Refills: 0 | Status: ACTIVE | COMMUNITY

## 2025-01-24 RX ORDER — VITAMIN B COMPLEX
CAPSULE ORAL
Refills: 0 | Status: ACTIVE | COMMUNITY

## 2025-01-24 RX ORDER — OMEGA-3/DHA/EPA/FISH OIL 300-1000MG
400 CAPSULE ORAL
Refills: 0 | Status: ACTIVE | COMMUNITY

## 2025-01-24 RX ORDER — CETIRIZINE HCL 10 MG
10 TABLET ORAL
Refills: 0 | Status: ACTIVE | COMMUNITY

## 2025-01-27 ENCOUNTER — APPOINTMENT (OUTPATIENT)
Dept: CARDIOLOGY | Facility: CLINIC | Age: 75
End: 2025-01-27
Payer: MEDICARE

## 2025-01-27 VITALS
DIASTOLIC BLOOD PRESSURE: 60 MMHG | WEIGHT: 188 LBS | BODY MASS INDEX: 29.45 KG/M2 | OXYGEN SATURATION: 98 % | HEART RATE: 65 BPM | SYSTOLIC BLOOD PRESSURE: 100 MMHG

## 2025-01-27 DIAGNOSIS — E78.5 HYPERLIPIDEMIA, UNSPECIFIED: ICD-10-CM

## 2025-01-27 DIAGNOSIS — I10 ESSENTIAL (PRIMARY) HYPERTENSION: ICD-10-CM

## 2025-01-27 DIAGNOSIS — R94.31 ABNORMAL ELECTROCARDIOGRAM [ECG] [EKG]: ICD-10-CM

## 2025-01-27 DIAGNOSIS — R06.09 OTHER FORMS OF DYSPNEA: ICD-10-CM

## 2025-01-27 DIAGNOSIS — I77.810 THORACIC AORTIC ECTASIA: ICD-10-CM

## 2025-01-27 PROCEDURE — 99214 OFFICE O/P EST MOD 30 MIN: CPT

## 2025-01-27 PROCEDURE — 93000 ELECTROCARDIOGRAM COMPLETE: CPT

## 2025-02-27 ENCOUNTER — APPOINTMENT (OUTPATIENT)
Dept: CARDIOLOGY | Facility: CLINIC | Age: 75
End: 2025-02-27
Payer: MEDICARE

## 2025-02-27 PROCEDURE — 93015 CV STRESS TEST SUPVJ I&R: CPT

## 2025-02-27 PROCEDURE — 93306 TTE W/DOPPLER COMPLETE: CPT

## 2025-02-27 PROCEDURE — A9502: CPT | Mod: JZ

## 2025-02-27 PROCEDURE — 78452 HT MUSCLE IMAGE SPECT MULT: CPT

## 2025-02-27 PROCEDURE — 93880 EXTRACRANIAL BILAT STUDY: CPT

## 2025-03-05 ENCOUNTER — APPOINTMENT (OUTPATIENT)
Dept: CARDIOLOGY | Facility: CLINIC | Age: 75
End: 2025-03-05

## 2025-03-05 DIAGNOSIS — I73.9 PERIPHERAL VASCULAR DISEASE, UNSPECIFIED: ICD-10-CM

## 2025-03-05 DIAGNOSIS — R07.9 CHEST PAIN, UNSPECIFIED: ICD-10-CM

## 2025-03-05 DIAGNOSIS — Z87.898 PERSONAL HISTORY OF OTHER SPECIFIED CONDITIONS: ICD-10-CM

## 2025-03-06 ENCOUNTER — APPOINTMENT (OUTPATIENT)
Dept: CARDIOLOGY | Facility: CLINIC | Age: 75
End: 2025-03-06
Payer: MEDICARE

## 2025-03-06 VITALS
BODY MASS INDEX: 29.35 KG/M2 | HEART RATE: 78 BPM | OXYGEN SATURATION: 95 % | HEIGHT: 67 IN | DIASTOLIC BLOOD PRESSURE: 60 MMHG | SYSTOLIC BLOOD PRESSURE: 116 MMHG | WEIGHT: 187 LBS

## 2025-03-06 DIAGNOSIS — R26.9 UNSPECIFIED ABNORMALITIES OF GAIT AND MOBILITY: ICD-10-CM

## 2025-03-06 DIAGNOSIS — R06.09 OTHER FORMS OF DYSPNEA: ICD-10-CM

## 2025-03-06 DIAGNOSIS — R94.39 ABNORMAL RESULT OF OTHER CARDIOVASCULAR FUNCTION STUDY: ICD-10-CM

## 2025-03-06 DIAGNOSIS — E78.5 HYPERLIPIDEMIA, UNSPECIFIED: ICD-10-CM

## 2025-03-06 DIAGNOSIS — I77.810 THORACIC AORTIC ECTASIA: ICD-10-CM

## 2025-03-06 DIAGNOSIS — R94.31 ABNORMAL ELECTROCARDIOGRAM [ECG] [EKG]: ICD-10-CM

## 2025-03-06 DIAGNOSIS — I10 ESSENTIAL (PRIMARY) HYPERTENSION: ICD-10-CM

## 2025-03-06 DIAGNOSIS — G47.33 OBSTRUCTIVE SLEEP APNEA (ADULT) (PEDIATRIC): ICD-10-CM

## 2025-03-06 PROCEDURE — 99215 OFFICE O/P EST HI 40 MIN: CPT

## 2025-04-16 ENCOUNTER — APPOINTMENT (OUTPATIENT)
Dept: CARDIOLOGY | Facility: CLINIC | Age: 75
End: 2025-04-16

## 2025-09-10 ENCOUNTER — APPOINTMENT (OUTPATIENT)
Dept: CARDIOLOGY | Facility: CLINIC | Age: 75
End: 2025-09-10
Payer: MEDICARE

## 2025-09-10 VITALS
WEIGHT: 192 LBS | HEIGHT: 67 IN | SYSTOLIC BLOOD PRESSURE: 114 MMHG | BODY MASS INDEX: 30.13 KG/M2 | HEART RATE: 77 BPM | OXYGEN SATURATION: 95 % | DIASTOLIC BLOOD PRESSURE: 56 MMHG

## 2025-09-10 DIAGNOSIS — R94.31 ABNORMAL ELECTROCARDIOGRAM [ECG] [EKG]: ICD-10-CM

## 2025-09-10 DIAGNOSIS — R94.39 ABNORMAL RESULT OF OTHER CARDIOVASCULAR FUNCTION STUDY: ICD-10-CM

## 2025-09-10 DIAGNOSIS — Z87.898 PERSONAL HISTORY OF OTHER SPECIFIED CONDITIONS: ICD-10-CM

## 2025-09-10 DIAGNOSIS — E78.5 HYPERLIPIDEMIA, UNSPECIFIED: ICD-10-CM

## 2025-09-10 DIAGNOSIS — I73.9 PERIPHERAL VASCULAR DISEASE, UNSPECIFIED: ICD-10-CM

## 2025-09-10 DIAGNOSIS — I10 ESSENTIAL (PRIMARY) HYPERTENSION: ICD-10-CM

## 2025-09-10 DIAGNOSIS — R06.09 OTHER FORMS OF DYSPNEA: ICD-10-CM

## 2025-09-10 DIAGNOSIS — R07.9 CHEST PAIN, UNSPECIFIED: ICD-10-CM

## 2025-09-10 DIAGNOSIS — M17.10 UNILATERAL PRIMARY OSTEOARTHRITIS, UNSPECIFIED KNEE: ICD-10-CM

## 2025-09-10 DIAGNOSIS — I77.810 THORACIC AORTIC ECTASIA: ICD-10-CM

## 2025-09-10 PROCEDURE — 99215 OFFICE O/P EST HI 40 MIN: CPT
